# Patient Record
Sex: FEMALE | Race: BLACK OR AFRICAN AMERICAN | NOT HISPANIC OR LATINO | Employment: FULL TIME | URBAN - METROPOLITAN AREA
[De-identification: names, ages, dates, MRNs, and addresses within clinical notes are randomized per-mention and may not be internally consistent; named-entity substitution may affect disease eponyms.]

---

## 2023-12-04 ENCOUNTER — TELEPHONE (OUTPATIENT)
Dept: OBSTETRICS AND GYNECOLOGY | Facility: CLINIC | Age: 28
End: 2023-12-04
Payer: COMMERCIAL

## 2023-12-04 NOTE — TELEPHONE ENCOUNTER
"----- Message from Melo Lindo DO sent at 12/1/2023 10:50 AM EST -----  Regarding: \"pregnancy appt\"  On 12/21--make this go away if she is pregnant please.    LM to call the office to [facility] the 12/21 apt with a different provider - Dr. Lindo does not do OB's    "

## 2023-12-14 ENCOUNTER — APPOINTMENT (OUTPATIENT)
Dept: OBSTETRICS AND GYNECOLOGY | Facility: CLINIC | Age: 28
End: 2023-12-14
Payer: COMMERCIAL

## 2023-12-20 ENCOUNTER — LAB (OUTPATIENT)
Dept: LAB | Facility: LAB | Age: 28
End: 2023-12-20
Payer: COMMERCIAL

## 2023-12-20 ENCOUNTER — INITIAL PRENATAL (OUTPATIENT)
Dept: OBSTETRICS AND GYNECOLOGY | Facility: CLINIC | Age: 28
End: 2023-12-20
Payer: COMMERCIAL

## 2023-12-20 VITALS — DIASTOLIC BLOOD PRESSURE: 78 MMHG | SYSTOLIC BLOOD PRESSURE: 112 MMHG | BODY MASS INDEX: 21.9 KG/M2 | WEIGHT: 131.6 LBS

## 2023-12-20 DIAGNOSIS — Z32.01 PREGNANCY TEST POSITIVE (HHS-HCC): Primary | ICD-10-CM

## 2023-12-20 DIAGNOSIS — O26.891 PREGNANCY HEADACHE IN FIRST TRIMESTER (HHS-HCC): ICD-10-CM

## 2023-12-20 DIAGNOSIS — R51.9 PREGNANCY HEADACHE IN FIRST TRIMESTER (HHS-HCC): ICD-10-CM

## 2023-12-20 DIAGNOSIS — O21.9 NAUSEA AND VOMITING IN PREGNANCY (HHS-HCC): ICD-10-CM

## 2023-12-20 DIAGNOSIS — Z3A.08 8 WEEKS GESTATION OF PREGNANCY (HHS-HCC): ICD-10-CM

## 2023-12-20 DIAGNOSIS — Z34.81 PRENATAL CARE, SUBSEQUENT PREGNANCY IN FIRST TRIMESTER (HHS-HCC): ICD-10-CM

## 2023-12-20 DIAGNOSIS — Z32.01 PREGNANCY TEST POSITIVE (HHS-HCC): ICD-10-CM

## 2023-12-20 DIAGNOSIS — O26.811 PREGNANCY RELATED FATIGUE IN FIRST TRIMESTER (HHS-HCC): ICD-10-CM

## 2023-12-20 LAB
ABO GROUP (TYPE) IN BLOOD: NORMAL
ANTIBODY SCREEN: NORMAL
ERYTHROCYTE [DISTWIDTH] IN BLOOD BY AUTOMATED COUNT: 11.6 % (ref 11.5–14.5)
HBV SURFACE AG SERPL QL IA: NONREACTIVE
HCT VFR BLD AUTO: 39.3 % (ref 36–46)
HCV AB SER QL: NONREACTIVE
HGB BLD-MCNC: 13.7 G/DL (ref 12–16)
HIV 1+2 AB+HIV1 P24 AG SERPL QL IA: NONREACTIVE
MCH RBC QN AUTO: 30.5 PG (ref 26–34)
MCHC RBC AUTO-ENTMCNC: 34.9 G/DL (ref 32–36)
MCV RBC AUTO: 88 FL (ref 80–100)
NRBC BLD-RTO: 0 /100 WBCS (ref 0–0)
PLATELET # BLD AUTO: 233 X10*3/UL (ref 150–450)
PREGNANCY TEST URINE, POC: POSITIVE
RBC # BLD AUTO: 4.49 X10*6/UL (ref 4–5.2)
REFLEX ADDED, ANEMIA PANEL: NORMAL
RH FACTOR (ANTIGEN D): NORMAL
RUBV IGG SERPL IA-ACNC: 4.8 IA
RUBV IGG SERPL QL IA: POSITIVE
T PALLIDUM AB SER QL: NONREACTIVE
WBC # BLD AUTO: 10.5 X10*3/UL (ref 4.4–11.3)

## 2023-12-20 PROCEDURE — 36415 COLL VENOUS BLD VENIPUNCTURE: CPT

## 2023-12-20 PROCEDURE — 86901 BLOOD TYPING SEROLOGIC RH(D): CPT

## 2023-12-20 PROCEDURE — 86850 RBC ANTIBODY SCREEN: CPT

## 2023-12-20 PROCEDURE — 99204 OFFICE O/P NEW MOD 45 MIN: CPT | Performed by: ADVANCED PRACTICE MIDWIFE

## 2023-12-20 PROCEDURE — 85027 COMPLETE CBC AUTOMATED: CPT

## 2023-12-20 PROCEDURE — 87800 DETECT AGNT MULT DNA DIREC: CPT

## 2023-12-20 PROCEDURE — 87086 URINE CULTURE/COLONY COUNT: CPT

## 2023-12-20 PROCEDURE — 87340 HEPATITIS B SURFACE AG IA: CPT

## 2023-12-20 PROCEDURE — 86803 HEPATITIS C AB TEST: CPT

## 2023-12-20 PROCEDURE — 86317 IMMUNOASSAY INFECTIOUS AGENT: CPT

## 2023-12-20 PROCEDURE — 87389 HIV-1 AG W/HIV-1&-2 AB AG IA: CPT

## 2023-12-20 PROCEDURE — 86780 TREPONEMA PALLIDUM: CPT

## 2023-12-20 PROCEDURE — H1000 PRENATAL CARE ATRISK ASSESSM: HCPCS | Performed by: ADVANCED PRACTICE MIDWIFE

## 2023-12-20 PROCEDURE — 81025 URINE PREGNANCY TEST: CPT | Performed by: ADVANCED PRACTICE MIDWIFE

## 2023-12-20 PROCEDURE — 86900 BLOOD TYPING SEROLOGIC ABO: CPT

## 2023-12-20 NOTE — PROGRESS NOTES
OB History    No obstetric history on file.       New patient, here for New OB visit.   OB History: - [place] [facility] [place] H/O EAB in 2019. Specifically denies H/O HDP, PPH,  or GDM.   LMP: Sure of date, though reports amount was lighter than normal. Discussed REMEDIOS determination.   ASA prophylaxis: Only risk factor is AA race, therefore prophylaxis is not indicated.   PNV: Taking OTC without difficulties.   Past Medical History:   Diagnosis Date    Depression, unspecified 2016    Depressed state   PMH: - Depression/Anxiety- no meds, though admits to vaping THC and nicotine to assist with symptoms. Stopped vaping with + UPT and does not plan to resume use. Continues to feel anxious though denies need to see a provider or use medication. Talks with her sister which provides improvement. Encouraged to alert CNM if symptoms worsen, or if medication is needed/desired.   - Dairy and gluten intolerance   New OB Labs: Ordered. Req. given for pt. to have drawn.   Pap: Last pap done 20 and negative. No history of abnormal paps. Reviewed recommendations regarding frequency of pap screening. Will do pap with PE, at next visit.   Covid Vaccine: Received x 2. Strong recommendation for receipt of bivalent booster; pt will consider.   Flu Vaccine: Strongly recommended; pt declines.   US: Discussed dating US; order placed. Also discussed time frame for NT and anatomy US's.   NIPS: Discussed/offered testing. Pt desires, therefore will order/give kit at [facility].   Current Symptoms:  - Fatigue- discussed occurrence in first trimester. Encouraged naps, when possible.   - Nausea and vomiting- current weight represents 2 lb loss from pre-pregnancy weight. Does report improvement, especially in vomiting, with use of sea bands. Discussed other OTC and non pharm relief measures. List of meds safe in pregnancy given. Encouraged to alert CNM if N/V worsens or if Rx medication is needed/desired.   - HA- discussed  use of Mag Oxide and Tylenol PRN.   Reports recently noting symptoms of ? Candidiasis. Treated with Diflucan (had remaining from a previous Rx) and reports symptoms resolved. Discussed Diflucan use in pregnancy. Encouraged to alert CNM if symptoms noted in the future.     New OB education done.   No other questions or concerns.     Next visit:   - Review new OB labs   - Review dating US   - Do PE with pap   - Order NIPS/give kit   - Follow up on HA, fatigue, N/V   - Check on Covid booster

## 2023-12-21 ENCOUNTER — ANCILLARY PROCEDURE (OUTPATIENT)
Dept: RADIOLOGY | Facility: CLINIC | Age: 28
End: 2023-12-21
Payer: COMMERCIAL

## 2023-12-21 ENCOUNTER — APPOINTMENT (OUTPATIENT)
Dept: OBSTETRICS AND GYNECOLOGY | Facility: CLINIC | Age: 28
End: 2023-12-21
Payer: COMMERCIAL

## 2023-12-21 DIAGNOSIS — Z32.01 PREGNANCY TEST POSITIVE (HHS-HCC): ICD-10-CM

## 2023-12-21 LAB
BACTERIA UR CULT: NO GROWTH
C TRACH RRNA SPEC QL NAA+PROBE: NEGATIVE
N GONORRHOEA DNA SPEC QL PROBE+SIG AMP: NEGATIVE

## 2023-12-21 PROCEDURE — 76801 OB US < 14 WKS SINGLE FETUS: CPT | Performed by: STUDENT IN AN ORGANIZED HEALTH CARE EDUCATION/TRAINING PROGRAM

## 2023-12-21 PROCEDURE — 76817 TRANSVAGINAL US OBSTETRIC: CPT | Performed by: STUDENT IN AN ORGANIZED HEALTH CARE EDUCATION/TRAINING PROGRAM

## 2023-12-21 PROCEDURE — 76817 TRANSVAGINAL US OBSTETRIC: CPT

## 2024-01-17 ENCOUNTER — ROUTINE PRENATAL (OUTPATIENT)
Dept: OBSTETRICS AND GYNECOLOGY | Facility: CLINIC | Age: 29
End: 2024-01-17
Payer: COMMERCIAL

## 2024-01-17 DIAGNOSIS — O26.892 HEADACHE IN PREGNANCY, SECOND TRIMESTER (HHS-HCC): ICD-10-CM

## 2024-01-17 DIAGNOSIS — Z3A.12 12 WEEKS GESTATION OF PREGNANCY (HHS-HCC): ICD-10-CM

## 2024-01-17 DIAGNOSIS — R51.9 HEADACHE IN PREGNANCY, SECOND TRIMESTER (HHS-HCC): ICD-10-CM

## 2024-01-17 DIAGNOSIS — K59.00 CONSTIPATION IN PREGNANCY IN SECOND TRIMESTER (HHS-HCC): ICD-10-CM

## 2024-01-17 DIAGNOSIS — Z34.82 PRENATAL CARE, SUBSEQUENT PREGNANCY, SECOND TRIMESTER (HHS-HCC): Primary | ICD-10-CM

## 2024-01-17 DIAGNOSIS — R09.81 NASAL CONGESTION: ICD-10-CM

## 2024-01-17 DIAGNOSIS — O99.612 CONSTIPATION IN PREGNANCY IN SECOND TRIMESTER (HHS-HCC): ICD-10-CM

## 2024-01-17 PROCEDURE — 99214 OFFICE O/P EST MOD 30 MIN: CPT | Performed by: ADVANCED PRACTICE MIDWIFE

## 2024-01-18 NOTE — PROGRESS NOTES
"  Taking PNV without difficulties.   Denies s/s of UTI.   4 lb. weight gain since previous visit.   Reports N/V have significantly improved. Denies any nausea x approximately 2 weeks. Aware of relief measures if needed, as previously discussed.   PE with pap deferred to next visit due to computer's being down at time of today's visit.   Has not yet received Covid booster. Pt still considering, therefore will readdress at next visit.   Reports constipation, at times not having a BM x 4 days. Started taking Senekot and Miralax and reports improvement. Also discussed daily use of stool softeners until regular, soft BM's.   Also reports nasal congestion which is worse at night. Has been using Afrin, which does provide improvement, though pt notices she will get a HA if Afrin is used more than 3-4x/week. Discussed other relief measures, including saline nasal spray, other OTC meds safe in pregnancy.   Reviewed new OB labs- WNL.   Reviewed dating US results- REMEDIOS agrees, cystic structure noted at fetal abdomen, possibly cystic cord or midgut herniation. Pt aware of US findings.   Next US (NT) scheduled 1/19.   Continues to desire NIPS testing; will place order when computers restored and pt will  kit and req.   Reports HA's continue occur, described as a \"migraine\" generally 1x/week. Takes 1 Tylenol, though does not provide improvement. Discussed optimal dosing of Tylenol, use of Mag Oxide daily. Pt aware and will purchase.   No other questions or concerns.     There were no vitals filed for this visit.    NEXT VISIT PLAN:   - Do PE with pap   - Check on Covid booster   - Follow up on constipation   - Follow up on nasal congestion   - Review NT US results   - Review NIPS   - Follow up on HA's/Mag Oxide                 DARLENE Hunt-CECIL                "

## 2024-01-19 ENCOUNTER — HOSPITAL ENCOUNTER (OUTPATIENT)
Dept: RADIOLOGY | Facility: CLINIC | Age: 29
Discharge: HOME | End: 2024-01-19
Payer: COMMERCIAL

## 2024-01-19 DIAGNOSIS — Z36.82 ENCOUNTER FOR ANTENATAL SCREENING FOR NUCHAL TRANSLUCENCY (HHS-HCC): ICD-10-CM

## 2024-01-19 PROCEDURE — 76816 OB US FOLLOW-UP PER FETUS: CPT | Performed by: STUDENT IN AN ORGANIZED HEALTH CARE EDUCATION/TRAINING PROGRAM

## 2024-01-19 PROCEDURE — 76817 TRANSVAGINAL US OBSTETRIC: CPT

## 2024-01-19 PROCEDURE — 76817 TRANSVAGINAL US OBSTETRIC: CPT | Performed by: STUDENT IN AN ORGANIZED HEALTH CARE EDUCATION/TRAINING PROGRAM

## 2024-01-19 PROCEDURE — 76816 OB US FOLLOW-UP PER FETUS: CPT

## 2024-01-23 DIAGNOSIS — Z34.81 PRENATAL CARE, SUBSEQUENT PREGNANCY IN FIRST TRIMESTER (HHS-HCC): Primary | ICD-10-CM

## 2024-01-25 ENCOUNTER — LAB (OUTPATIENT)
Dept: LAB | Facility: LAB | Age: 29
End: 2024-01-25
Payer: COMMERCIAL

## 2024-01-25 DIAGNOSIS — Z34.81 PRENATAL CARE, SUBSEQUENT PREGNANCY IN FIRST TRIMESTER (HHS-HCC): ICD-10-CM

## 2024-02-13 LAB — SCAN RESULT: NORMAL

## 2024-02-14 ENCOUNTER — ROUTINE PRENATAL (OUTPATIENT)
Dept: OBSTETRICS AND GYNECOLOGY | Facility: CLINIC | Age: 29
End: 2024-02-14
Payer: COMMERCIAL

## 2024-02-14 VITALS — WEIGHT: 143.3 LBS | SYSTOLIC BLOOD PRESSURE: 116 MMHG | BODY MASS INDEX: 23.85 KG/M2 | DIASTOLIC BLOOD PRESSURE: 64 MMHG

## 2024-02-14 DIAGNOSIS — Z3A.16 16 WEEKS GESTATION OF PREGNANCY (HHS-HCC): ICD-10-CM

## 2024-02-14 DIAGNOSIS — Z34.82 PRENATAL CARE, SUBSEQUENT PREGNANCY, SECOND TRIMESTER (HHS-HCC): Primary | ICD-10-CM

## 2024-02-14 PROCEDURE — 87624 HPV HI-RISK TYP POOLED RSLT: CPT

## 2024-02-14 PROCEDURE — 88141 CYTOPATH C/V INTERPRET: CPT | Performed by: PATHOLOGY

## 2024-02-14 PROCEDURE — 99214 OFFICE O/P EST MOD 30 MIN: CPT | Performed by: ADVANCED PRACTICE MIDWIFE

## 2024-02-14 PROCEDURE — 88175 CYTOPATH C/V AUTO FLUID REDO: CPT

## 2024-02-14 NOTE — PROGRESS NOTES
Feeling fluttering fetal movement.   Taking PNV without difficulties.   12 lb. weight gain since last in person visit on 12/20/23. Reviewed optimal interval and total pregnancy weight gain.   Reports HA's have essentially resolved with drinking one small cup of coffee every morning.  PE and pap completed.   Is still planning Covid booster, though has forgotten to receive.   Reports constipation has improved with use of Miralax and Colace. Discussed continuance or weaning, based on symptoms.   Reports nasal congestion resolved, without intervention.   Reviewed NT US results- REMEDIOS agrees, NT could not be measured, no cord cyst noted and abdominal cord insertion noted to be WNL.   Anatomy US scheduled 3/1.   Aware of NIPS results- negative, having a BOY!   No other questions or concerns.     Vitals:    02/14/24 1431   Weight: 65 kg (143 lb 4.8 oz)       NEXT VISIT PLAN:   - Review pap result  - Check if pt received Covid booster   - Review anatomy US results             DARLENE Hunt-CECIL

## 2024-02-15 ENCOUNTER — TELEPHONE (OUTPATIENT)
Dept: OBSTETRICS AND GYNECOLOGY | Facility: CLINIC | Age: 29
End: 2024-02-15
Payer: COMMERCIAL

## 2024-02-15 NOTE — TELEPHONE ENCOUNTER
----- Message from STARLA Hunt sent at 2/13/2024  7:10 PM EST -----  Please advise Taina that her NIPS testing was negative.   Also, if she would like you to tell her, she is having a boy!

## 2024-02-28 LAB
CYTOLOGY CMNT CVX/VAG CYTO-IMP: NORMAL
HPV HR 12 DNA GENITAL QL NAA+PROBE: NEGATIVE
HPV HR GENOTYPES PNL CVX NAA+PROBE: NEGATIVE
HPV16 DNA SPEC QL NAA+PROBE: NEGATIVE
HPV18 DNA SPEC QL NAA+PROBE: NEGATIVE
LAB AP HPV GENOTYPE QUESTION: YES
LAB AP HPV HR: NORMAL
LABORATORY COMMENT REPORT: NORMAL
PATH REPORT.TOTAL CANCER: NORMAL

## 2024-03-01 ENCOUNTER — HOSPITAL ENCOUNTER (OUTPATIENT)
Dept: RADIOLOGY | Facility: CLINIC | Age: 29
Discharge: HOME | End: 2024-03-01
Payer: COMMERCIAL

## 2024-03-01 DIAGNOSIS — Z32.01 PREGNANCY TEST POSITIVE (HHS-HCC): ICD-10-CM

## 2024-03-01 PROCEDURE — 76805 OB US >/= 14 WKS SNGL FETUS: CPT

## 2024-03-01 PROCEDURE — 76805 OB US >/= 14 WKS SNGL FETUS: CPT | Performed by: STUDENT IN AN ORGANIZED HEALTH CARE EDUCATION/TRAINING PROGRAM

## 2024-03-13 ENCOUNTER — ROUTINE PRENATAL (OUTPATIENT)
Dept: OBSTETRICS AND GYNECOLOGY | Facility: CLINIC | Age: 29
End: 2024-03-13
Payer: COMMERCIAL

## 2024-03-13 VITALS — SYSTOLIC BLOOD PRESSURE: 102 MMHG | BODY MASS INDEX: 25.51 KG/M2 | WEIGHT: 153.3 LBS | DIASTOLIC BLOOD PRESSURE: 58 MMHG

## 2024-03-13 DIAGNOSIS — J30.2 SEASONAL ALLERGIES: ICD-10-CM

## 2024-03-13 DIAGNOSIS — Z3A.20 20 WEEKS GESTATION OF PREGNANCY (HHS-HCC): ICD-10-CM

## 2024-03-13 DIAGNOSIS — Z34.82 PRENATAL CARE, SUBSEQUENT PREGNANCY, SECOND TRIMESTER (HHS-HCC): Primary | ICD-10-CM

## 2024-03-13 PROCEDURE — H1000 PRENATAL CARE ATRISK ASSESSM: HCPCS | Performed by: ADVANCED PRACTICE MIDWIFE

## 2024-03-13 PROCEDURE — 99214 OFFICE O/P EST MOD 30 MIN: CPT | Performed by: ADVANCED PRACTICE MIDWIFE

## 2024-03-13 NOTE — PROGRESS NOTES
Good fetal movement.   Taking PNV without difficulties.   Denies s/s of UTI.   10 lb. weight gain since previous visit. 22 lb total gain from initial visit at 8 weeks gestation. Pt does report always feeling hungry and at times overeating. Discussed optimal interval and total pregnancy weight gains, healthy dietary/snack options.   Reports recent exacerbation of seasonal allergies. Discussed medications safe in pregnancy (pt prefers to use Zyrtec).   Taking stool softener daily and reports having a daily BM. Denies constipation. Discussed continuance.   Reviewed anatomy US results- initial US with some SV anatomy, REMEDIOS agrees. Has follow up US scheduled 3/18.   Has not yet received Covid booster, though plans to go today after visit.   Reviewed pap results- ASCUS, negative HPV. Discussed next pap in 3 years. Pt aware.   No other questions or concerns.     Vitals:    03/13/24 1102   Weight: 69.5 kg (153 lb 4.8 oz)       NEXT VISIT PLAN:   - Check if received Covid booster  - Follow up on seasonal allergies   - Review follow up US results             STARLA Hunt

## 2024-03-18 ENCOUNTER — HOSPITAL ENCOUNTER (OUTPATIENT)
Dept: RADIOLOGY | Facility: CLINIC | Age: 29
Discharge: HOME | End: 2024-03-18
Payer: COMMERCIAL

## 2024-03-18 DIAGNOSIS — Z32.01 PREGNANCY TEST POSITIVE (HHS-HCC): ICD-10-CM

## 2024-03-18 PROCEDURE — 76816 OB US FOLLOW-UP PER FETUS: CPT

## 2024-03-18 PROCEDURE — 76817 TRANSVAGINAL US OBSTETRIC: CPT

## 2024-03-18 PROCEDURE — 76817 TRANSVAGINAL US OBSTETRIC: CPT | Performed by: OBSTETRICS & GYNECOLOGY

## 2024-03-18 PROCEDURE — 76816 OB US FOLLOW-UP PER FETUS: CPT | Performed by: OBSTETRICS & GYNECOLOGY

## 2024-04-10 ENCOUNTER — ROUTINE PRENATAL (OUTPATIENT)
Dept: OBSTETRICS AND GYNECOLOGY | Facility: CLINIC | Age: 29
End: 2024-04-10
Payer: COMMERCIAL

## 2024-04-10 VITALS — SYSTOLIC BLOOD PRESSURE: 112 MMHG | BODY MASS INDEX: 27.11 KG/M2 | DIASTOLIC BLOOD PRESSURE: 72 MMHG | WEIGHT: 162.9 LBS

## 2024-04-10 DIAGNOSIS — Z34.82 PRENATAL CARE, SUBSEQUENT PREGNANCY, SECOND TRIMESTER (HHS-HCC): Primary | ICD-10-CM

## 2024-04-10 DIAGNOSIS — Z3A.24 24 WEEKS GESTATION OF PREGNANCY (HHS-HCC): ICD-10-CM

## 2024-04-10 DIAGNOSIS — J30.2 SEASONAL ALLERGIES: ICD-10-CM

## 2024-04-10 DIAGNOSIS — J45.30 MILD PERSISTENT ASTHMA WITHOUT COMPLICATION (HHS-HCC): ICD-10-CM

## 2024-04-10 DIAGNOSIS — G47.9 SLEEPING DIFFICULTIES: ICD-10-CM

## 2024-04-10 PROCEDURE — 99214 OFFICE O/P EST MOD 30 MIN: CPT | Performed by: ADVANCED PRACTICE MIDWIFE

## 2024-04-10 RX ORDER — ALBUTEROL SULFATE 90 UG/1
2 AEROSOL, METERED RESPIRATORY (INHALATION) EVERY 6 HOURS PRN
Qty: 18 G | Refills: 1 | Status: SHIPPED | OUTPATIENT
Start: 2024-04-10 | End: 2025-04-10

## 2024-04-10 NOTE — PROGRESS NOTES
Good fetal movement. Denies contractions, vaginal bleeding, or LOF.   Taking PNV without difficulties.   Denies s/s of UTI.   9 lb. weight gain since previous visit.   Has appt for Covid booster tomorrow.   Discussed 1 hour GTT, CBC to be done at or just prior to next visit. Pt reports she will have done immediately after next visit. Order placed today. Reviewed instructions for testing.   Has B+ blood type.   Reviewed US results- all anatomy now seen and WNL.   Next US scheduled 5/24 for growth, secondary to history of abdominal liposuction.   Reports seasonal allergies managed with almost daily use of Zyrtec or Claritin.   Has past history of asthma which has recently exacerbated and has resulted in frequent episodes of wheezing and feeling short of breath, generally occurring at night. Used Albuterol in the past, though did not currently have her own inhaler, therefore has been using an Albuterol inhaler belonging to her son. Reports some improvement with use, though requires 1-2 puffs generally 5 out of 7 nights per week. Discussed difference between rescue and maintenance meds for asthma. Does not currently have a primary care provider, therefore referral placed. Encouraged to be seen as soon as possible. Pt in agreement.   Reports some difficulties getting comfortable and sleeping well. Has purchased pregnancy pillow to assess for improvement.   No other questions or concerns.       Vitals:    04/10/24 1050   Weight: 73.9 kg (162 lb 14.4 oz)       NEXT VISIT PLAN:   - 1 hour GTT, CBC following visit  - Tdap at next visit, if desired   - Follow up on primary care visit/asthma   - Growth US scheduled 5/24           STARLA Hunt

## 2024-04-26 ENCOUNTER — OFFICE VISIT (OUTPATIENT)
Dept: PRIMARY CARE | Facility: CLINIC | Age: 29
End: 2024-04-26
Payer: COMMERCIAL

## 2024-04-26 VITALS
SYSTOLIC BLOOD PRESSURE: 98 MMHG | DIASTOLIC BLOOD PRESSURE: 56 MMHG | HEIGHT: 65 IN | BODY MASS INDEX: 28.66 KG/M2 | TEMPERATURE: 98.1 F | WEIGHT: 172 LBS | HEART RATE: 118 BPM

## 2024-04-26 DIAGNOSIS — J45.40 MODERATE PERSISTENT ASTHMA, UNSPECIFIED WHETHER COMPLICATED (HHS-HCC): Primary | ICD-10-CM

## 2024-04-26 DIAGNOSIS — J30.9 ALLERGIC RHINITIS, UNSPECIFIED SEASONALITY, UNSPECIFIED TRIGGER: ICD-10-CM

## 2024-04-26 PROBLEM — F32.A DEPRESSIVE DISORDER: Status: ACTIVE | Noted: 2024-04-26

## 2024-04-26 PROBLEM — N89.8 VAGINAL IRRITATION: Status: ACTIVE | Noted: 2024-04-26

## 2024-04-26 PROBLEM — A74.9 CHLAMYDIA INFECTION: Status: RESOLVED | Noted: 2024-04-26 | Resolved: 2024-04-26

## 2024-04-26 PROBLEM — E73.9 LACTOSE INTOLERANCE: Status: ACTIVE | Noted: 2024-04-26

## 2024-04-26 PROBLEM — R14.0 BLOATING: Status: ACTIVE | Noted: 2024-04-26

## 2024-04-26 PROBLEM — R10.2 PELVIC PAIN IN FEMALE: Status: ACTIVE | Noted: 2024-04-26

## 2024-04-26 PROBLEM — R76.8 POSITIVE AUTOANTIBODY SCREENING FOR CELIAC DISEASE: Status: ACTIVE | Noted: 2024-04-26

## 2024-04-26 PROBLEM — K63.8219 SMALL INTESTINAL BACTERIAL OVERGROWTH: Status: ACTIVE | Noted: 2024-04-26

## 2024-04-26 PROBLEM — A74.9 CHLAMYDIA INFECTION: Status: ACTIVE | Noted: 2024-04-26

## 2024-04-26 PROBLEM — R11.0 NAUSEA: Status: ACTIVE | Noted: 2024-04-26

## 2024-04-26 PROCEDURE — 1036F TOBACCO NON-USER: CPT | Performed by: FAMILY MEDICINE

## 2024-04-26 PROCEDURE — 99214 OFFICE O/P EST MOD 30 MIN: CPT | Performed by: FAMILY MEDICINE

## 2024-04-26 RX ORDER — FAMOTIDINE 20 MG/1
20 TABLET, FILM COATED ORAL EVERY 12 HOURS PRN
COMMUNITY
Start: 2024-02-08

## 2024-04-26 RX ORDER — FLUTICASONE PROPIONATE 50 MCG
2 SPRAY, SUSPENSION (ML) NASAL DAILY
Qty: 16 G | Refills: 5 | Status: SHIPPED | OUTPATIENT
Start: 2024-04-26 | End: 2024-05-20

## 2024-04-26 RX ORDER — FLUTICASONE PROPIONATE 220 UG/1
2 AEROSOL, METERED RESPIRATORY (INHALATION)
Qty: 12 G | Refills: 5 | Status: SHIPPED | OUTPATIENT
Start: 2024-04-26 | End: 2025-04-26

## 2024-04-26 ASSESSMENT — PATIENT HEALTH QUESTIONNAIRE - PHQ9
SUM OF ALL RESPONSES TO PHQ9 QUESTIONS 1 AND 2: 0
1. LITTLE INTEREST OR PLEASURE IN DOING THINGS: NOT AT ALL
2. FEELING DOWN, DEPRESSED OR HOPELESS: NOT AT ALL

## 2024-04-26 NOTE — PROGRESS NOTES
"     BP 98/56   Pulse (!) 118   Temp 36.7 °C (98.1 °F)   Ht 1.651 m (5' 5\")   Wt 78 kg (172 lb)   LMP 10/20/2023   BMI 28.62 kg/m²     Past Medical History:   Diagnosis Date    Depression, unspecified 09/16/2016    Depressed state       Patient Active Problem List   Diagnosis    Bloating    Chlamydia infection    Depressive disorder    Lactose intolerance    Nausea    Pelvic pain in female    Positive autoantibody screening for celiac disease    Small intestinal bacterial overgrowth    Vaginal irritation       Current Outpatient Medications   Medication Sig Dispense Refill    albuterol 90 mcg/actuation inhaler Inhale 2 puffs every 6 hours if needed for wheezing. 18 g 1    famotidine (Pepcid) 20 mg tablet Take 1 tablet (20 mg) by mouth every 12 hours if needed.      prenatal vit37/iron/folic acid (PRENATA ORAL) Take by mouth.       No current facility-administered medications for this visit.       CC/HPI/ASSESSMENT/PLAN    CC asthma    HPI patient 28-year-old female establishing care in the office today notes she has severe allergies and asthma.  She is currently pregnant.  She notes since pregnancy her asthma has been worse.  She been using her rescue inhaler multiple times a day.  She is taking Zyrtec over-the-counter for her allergies.  She notes persistent postnasal drainage and sneezing.  She wheezes frequently and uses her albuterol for relief.  Denies chest pain syncope abdominal pain blood in urine or stool    Exam calm eyes no jaundice mouth moist throat clear neck supple no LAD lungs good air exchange mild wheezing noted in the bases no rales or rhonchi.  CV RRR no murmur.  Ext no edema skin no rash    A/P 1.  Moderate persistent asthma.  Patient will start Flovent 222 puffs twice daily.  Continue to use albuterol as needed.  #2 allergic rhinitis.  Continue Zyrtec 10 mg daily, will add Flonase 2 sprays twice daily for a week then once a day afterwards.  She is advised to avoid outdoor pollens is much " as possible.  She will follow-up 6 weeks    There are no diagnoses linked to this encounter.

## 2024-05-03 ENCOUNTER — ROUTINE PRENATAL (OUTPATIENT)
Dept: OBSTETRICS AND GYNECOLOGY | Facility: CLINIC | Age: 29
End: 2024-05-03
Payer: COMMERCIAL

## 2024-05-03 ENCOUNTER — LAB (OUTPATIENT)
Dept: LAB | Facility: LAB | Age: 29
End: 2024-05-03
Payer: COMMERCIAL

## 2024-05-03 VITALS — DIASTOLIC BLOOD PRESSURE: 72 MMHG | SYSTOLIC BLOOD PRESSURE: 102 MMHG | WEIGHT: 167 LBS | BODY MASS INDEX: 27.79 KG/M2

## 2024-05-03 DIAGNOSIS — Z3A.28 28 WEEKS GESTATION OF PREGNANCY (HHS-HCC): ICD-10-CM

## 2024-05-03 DIAGNOSIS — Z13.1 SCREENING FOR DIABETES MELLITUS: ICD-10-CM

## 2024-05-03 DIAGNOSIS — R73.09 GLUCOSE TOLERANCE TEST ABNORMAL: Primary | ICD-10-CM

## 2024-05-03 DIAGNOSIS — Z34.83 PRENATAL CARE, SUBSEQUENT PREGNANCY, THIRD TRIMESTER (HHS-HCC): Primary | ICD-10-CM

## 2024-05-03 DIAGNOSIS — J45.40 MODERATE PERSISTENT ASTHMA WITHOUT COMPLICATION (HHS-HCC): ICD-10-CM

## 2024-05-03 DIAGNOSIS — L29.9 ITCHING: ICD-10-CM

## 2024-05-03 DIAGNOSIS — J30.2 SEASONAL ALLERGIES: ICD-10-CM

## 2024-05-03 LAB
ERYTHROCYTE [DISTWIDTH] IN BLOOD BY AUTOMATED COUNT: 12 % (ref 11.5–14.5)
GLUCOSE 1H P 50 G GLC PO SERPL-MCNC: 139 MG/DL
HCT VFR BLD AUTO: 39.7 % (ref 36–46)
HGB BLD-MCNC: 13.4 G/DL (ref 12–16)
MCH RBC QN AUTO: 30.5 PG (ref 26–34)
MCHC RBC AUTO-ENTMCNC: 33.8 G/DL (ref 32–36)
MCV RBC AUTO: 90 FL (ref 80–100)
NRBC BLD-RTO: 0 /100 WBCS (ref 0–0)
PLATELET # BLD AUTO: 264 X10*3/UL (ref 150–450)
RBC # BLD AUTO: 4.4 X10*6/UL (ref 4–5.2)
WBC # BLD AUTO: 10.9 X10*3/UL (ref 4.4–11.3)

## 2024-05-03 PROCEDURE — 99214 OFFICE O/P EST MOD 30 MIN: CPT | Performed by: ADVANCED PRACTICE MIDWIFE

## 2024-05-03 PROCEDURE — 36415 COLL VENOUS BLD VENIPUNCTURE: CPT

## 2024-05-03 PROCEDURE — 82947 ASSAY GLUCOSE BLOOD QUANT: CPT

## 2024-05-03 PROCEDURE — 90715 TDAP VACCINE 7 YRS/> IM: CPT | Performed by: ADVANCED PRACTICE MIDWIFE

## 2024-05-03 PROCEDURE — 85027 COMPLETE CBC AUTOMATED: CPT

## 2024-05-03 PROCEDURE — 90471 IMMUNIZATION ADMIN: CPT | Performed by: ADVANCED PRACTICE MIDWIFE

## 2024-05-03 ASSESSMENT — EDINBURGH POSTNATAL DEPRESSION SCALE (EPDS)
I HAVE BLAMED MYSELF UNNECESSARILY WHEN THINGS WENT WRONG: NO, NEVER
I HAVE BEEN ABLE TO LAUGH AND SEE THE FUNNY SIDE OF THINGS: AS MUCH AS I ALWAYS COULD
THE THOUGHT OF HARMING MYSELF HAS OCCURRED TO ME: NEVER
I HAVE FELT SAD OR MISERABLE: NO, NOT AT ALL
THINGS HAVE BEEN GETTING ON TOP OF ME: NO, I HAVE BEEN COPING AS WELL AS EVER
I HAVE BEEN SO UNHAPPY THAT I HAVE BEEN CRYING: NO, NEVER
I HAVE FELT SCARED OR PANICKY FOR NO GOOD REASON: NO, NOT AT ALL
TOTAL SCORE: 3
I HAVE LOOKED FORWARD WITH ENJOYMENT TO THINGS: AS MUCH AS I EVER DID
I HAVE BEEN ANXIOUS OR WORRIED FOR NO GOOD REASON: YES, VERY OFTEN
I HAVE BEEN SO UNHAPPY THAT I HAVE HAD DIFFICULTY SLEEPING: NOT AT ALL

## 2024-05-03 NOTE — PROGRESS NOTES
Good fetal movement. Denies contractions, vaginal bleeding, or LOF.   Denies s/s of UTI.   5 lb. weight loss since previous visit. Eating well, good appetite, denies N/V. No apparent reason for 5 lb weight loss.   Discussed 1 hour GTT, CBC to be done at this time. Reviewed instructions for testing. Pt reports she will have done immediately following visit.   Has B+ blood type.   Discussed/offered Tdap; pt desires, therefore given today.   Saw a new PCP, Dr Acosta, on 4/26 secondary to allergic rhinitis and asthma. Was started on Flovent and encouraged to continue Albuterol PRN. Also advised to continue Zyrtec and start Flonase. Is using Flovent as directed, has not used Albuterol since starting Flovent. Does not feel like it is helping, as she continues to have a significant amount of wheezing at night. Discussed PRN use of Albuterol to assess for improvement in wheezing. If consistent use of Albuterol is needed, encouraged to follow up with PCP.   Also reports Zyrtec is no longer effective, though has been taking Benadryl. Tried Flonase, though results in severe burning sensation in nose. Encouraged to alert PCP re this. Pt in agreement.   Has [employer]  scheduled 5/24.   Reports onset of whole body itching yesterday. Pt feels itching is related to worsening seasonal allergies. Denies any rash. Uses Dial or Ivory soap and denies change in soap, lotion, detergent, softener, etc. Denies itching on palms of hands or soles of feet. Discussed skin care measures, use of moisturizing lotion, avoidance of harsh products, pregnancy related warning signs. Encouraged to follow up with PCP if symptoms worsen. Pt in agreement.   No other questions or concerns.     Vitals:    05/03/24 1119   Weight: 75.8 kg (167 lb)       NEXT VISIT PLAN:   - Tdap given today   - Review 1 hour GTT, CBC   - Follow up on asthma, allergies and PCP visit   - Growth US 5/24   - Follow up on itching             Syeda Kelly  APRN-CNM

## 2024-05-04 LAB — REFLEX ADDED, ANEMIA PANEL: NORMAL

## 2024-05-06 ENCOUNTER — TELEPHONE (OUTPATIENT)
Dept: OBSTETRICS AND GYNECOLOGY | Facility: CLINIC | Age: 29
End: 2024-05-06
Payer: COMMERCIAL

## 2024-05-06 NOTE — TELEPHONE ENCOUNTER
----- Message from STARLA Hunt sent at 5/3/2024  4:06 PM EDT -----  Please advise pt that 1 hour GTT was elevated. Needs 3 hour GTT as soon as possible. Order is in chart.   Please review instructions for testing with pt.

## 2024-05-07 ENCOUNTER — LAB (OUTPATIENT)
Dept: LAB | Facility: LAB | Age: 29
End: 2024-05-07
Payer: COMMERCIAL

## 2024-05-07 DIAGNOSIS — R73.09 GLUCOSE TOLERANCE TEST ABNORMAL: ICD-10-CM

## 2024-05-07 LAB
GLUCOSE 1H P 100 G GLC PO SERPL-MCNC: 122 MG/DL
GLUCOSE 2H P 100 G GLC PO SERPL-MCNC: 100 MG/DL
GLUCOSE 3H P 100 G GLC PO SERPL-MCNC: 91 MG/DL
GLUCOSE P FAST SERPL-MCNC: 62 MG/DL

## 2024-05-07 PROCEDURE — 36415 COLL VENOUS BLD VENIPUNCTURE: CPT

## 2024-05-07 PROCEDURE — 82950 GLUCOSE TEST: CPT

## 2024-05-07 PROCEDURE — 82952 GTT-ADDED SAMPLES: CPT

## 2024-05-07 PROCEDURE — 82947 ASSAY GLUCOSE BLOOD QUANT: CPT

## 2024-05-07 PROCEDURE — 82951 GLUCOSE TOLERANCE TEST (GTT): CPT

## 2024-05-24 ENCOUNTER — HOSPITAL ENCOUNTER (OUTPATIENT)
Dept: RADIOLOGY | Facility: CLINIC | Age: 29
Discharge: HOME | End: 2024-05-24
Payer: COMMERCIAL

## 2024-05-24 DIAGNOSIS — O36.5930 MATERNAL CARE FOR OTHER KNOWN OR SUSPECTED POOR FETAL GROWTH, THIRD TRIMESTER, NOT APPLICABLE OR UNSPECIFIED (HHS-HCC): ICD-10-CM

## 2024-05-24 DIAGNOSIS — Z32.01 PREGNANCY TEST POSITIVE (HHS-HCC): ICD-10-CM

## 2024-05-24 PROCEDURE — 76816 OB US FOLLOW-UP PER FETUS: CPT | Performed by: OBSTETRICS & GYNECOLOGY

## 2024-05-24 PROCEDURE — 76820 UMBILICAL ARTERY ECHO: CPT

## 2024-05-24 PROCEDURE — 76819 FETAL BIOPHYS PROFIL W/O NST: CPT | Performed by: OBSTETRICS & GYNECOLOGY

## 2024-05-24 PROCEDURE — 76816 OB US FOLLOW-UP PER FETUS: CPT

## 2024-05-24 PROCEDURE — 76820 UMBILICAL ARTERY ECHO: CPT | Performed by: OBSTETRICS & GYNECOLOGY

## 2024-05-24 PROCEDURE — 76819 FETAL BIOPHYS PROFIL W/O NST: CPT

## 2024-05-28 ENCOUNTER — DOCUMENTATION (OUTPATIENT)
Dept: OBSTETRICS AND GYNECOLOGY | Facility: HOSPITAL | Age: 29
End: 2024-05-28
Payer: COMMERCIAL

## 2024-05-28 PROBLEM — Z34.80 PRENATAL CARE OF MULTIGRAVIDA, ANTEPARTUM (HHS-HCC): Status: ACTIVE | Noted: 2024-05-28

## 2024-05-28 PROBLEM — O36.5990 FETAL GROWTH RESTRICTION ANTEPARTUM (HHS-HCC): Status: ACTIVE | Noted: 2024-05-28

## 2024-05-28 PROBLEM — R10.2 PELVIC PAIN IN FEMALE: Status: RESOLVED | Noted: 2024-04-26 | Resolved: 2024-05-28

## 2024-05-28 PROBLEM — Z36.4 ULTRASOUND FOR ANTENATAL SCREENING FOR FETAL GROWTH RESTRICTION (HHS-HCC): Status: ACTIVE | Noted: 2024-05-28

## 2024-05-28 PROBLEM — N89.8 VAGINAL IRRITATION: Status: RESOLVED | Noted: 2024-04-26 | Resolved: 2024-05-28

## 2024-05-28 NOTE — PROGRESS NOTES
Reviewed recent US findings with Dr Sinhg- pt currently 31.4 weeks, on US AC individually noted to be 7%, EFW 19%, ANDREZ, BPP, Dopplers WNL. Plan per MFM is weekly BPP/Dopplers, which are scheduled.   Per Dr Singh, FGR by AC alone. Pt is getting appropriate weekly testing. Plan for delivery per [facility] recommendations.   Physician consult visit not necessary per Dr Singh.

## 2024-05-31 ENCOUNTER — HOSPITAL ENCOUNTER (OUTPATIENT)
Dept: RADIOLOGY | Facility: CLINIC | Age: 29
Discharge: HOME | End: 2024-05-31
Payer: COMMERCIAL

## 2024-05-31 DIAGNOSIS — Z32.01 PREGNANCY TEST POSITIVE (HHS-HCC): ICD-10-CM

## 2024-05-31 DIAGNOSIS — O36.5990 MATERNAL CARE FOR OTHER KNOWN OR SUSPECTED POOR FETAL GROWTH, UNSPECIFIED TRIMESTER, NOT APPLICABLE OR UNSPECIFIED (HHS-HCC): ICD-10-CM

## 2024-05-31 PROCEDURE — 76820 UMBILICAL ARTERY ECHO: CPT | Performed by: OBSTETRICS & GYNECOLOGY

## 2024-05-31 PROCEDURE — 76819 FETAL BIOPHYS PROFIL W/O NST: CPT | Performed by: OBSTETRICS & GYNECOLOGY

## 2024-05-31 PROCEDURE — 76820 UMBILICAL ARTERY ECHO: CPT

## 2024-05-31 PROCEDURE — 76819 FETAL BIOPHYS PROFIL W/O NST: CPT

## 2024-06-04 PROBLEM — Z36.4 ULTRASOUND FOR ANTENATAL SCREENING FOR FETAL GROWTH RESTRICTION (HHS-HCC): Status: RESOLVED | Noted: 2024-05-28 | Resolved: 2024-06-04

## 2024-06-05 ENCOUNTER — ROUTINE PRENATAL (OUTPATIENT)
Dept: OBSTETRICS AND GYNECOLOGY | Facility: CLINIC | Age: 29
End: 2024-06-05
Payer: COMMERCIAL

## 2024-06-05 VITALS — SYSTOLIC BLOOD PRESSURE: 106 MMHG | DIASTOLIC BLOOD PRESSURE: 64 MMHG | WEIGHT: 178.7 LBS | BODY MASS INDEX: 29.74 KG/M2

## 2024-06-05 DIAGNOSIS — Z34.80 PRENATAL CARE OF MULTIGRAVIDA, ANTEPARTUM (HHS-HCC): ICD-10-CM

## 2024-06-05 DIAGNOSIS — Z3A.32 32 WEEKS GESTATION OF PREGNANCY (HHS-HCC): Primary | ICD-10-CM

## 2024-06-05 DIAGNOSIS — J30.2 SEASONAL ALLERGIES: ICD-10-CM

## 2024-06-05 DIAGNOSIS — O36.5990 FETAL GROWTH RESTRICTION ANTEPARTUM (HHS-HCC): ICD-10-CM

## 2024-06-05 DIAGNOSIS — J45.40 MODERATE PERSISTENT ASTHMA WITHOUT COMPLICATION (HHS-HCC): ICD-10-CM

## 2024-06-05 PROCEDURE — 99214 OFFICE O/P EST MOD 30 MIN: CPT | Performed by: ADVANCED PRACTICE MIDWIFE

## 2024-06-05 NOTE — PROGRESS NOTES
"  Good fetal movement. Denies regular contractions, vaginal bleeding, or LOF. Does report occasional BH contractions, though not time able.   Aware of CNM emergency number and importance of calling prior to going to the hospital.   Taking PNV and other meds in med list, without difficulties.   Denies s/s of UTI.   11 lb. weight gain since previous visit.   Reviewed 1 hour GTT (139), 3 hour GTT (all WNL), and CBC (13.4/39.7) results.   Reports allergies are \"100% better\" with use of Flonase and Zyrtec daily.   Continues to have occasional episodes of wheezing at night, though feels that asthma is overall much better with use of Flovent.   Also reports itching resolved with use of allergy medication.   Appt with PCP for asthma and allergy follow-up will be re-scheduled due to BPP being scheduled at the same time.   Pt aware of growth US results, FGR by AC alone, and plan for weekly BPP/Dopplers, which are scheduled.   Last BPP/Dopplers on 5/31- 8/8, WNL.   Next scheduled on 6/7. Pt aware.   No other questions or concerns.     Vitals:    06/05/24 1422   Weight: 81.1 kg (178 lb 11.2 oz)       NEXT VISIT PLAN:   - Check if PCP visit re-scheduled   - Continue weekly BPP/Dopplers               DARLENE Hunt-MILENAM                "

## 2024-06-07 ENCOUNTER — APPOINTMENT (OUTPATIENT)
Dept: PRIMARY CARE | Facility: CLINIC | Age: 29
End: 2024-06-07
Payer: COMMERCIAL

## 2024-06-07 ENCOUNTER — HOSPITAL ENCOUNTER (OUTPATIENT)
Dept: RADIOLOGY | Facility: CLINIC | Age: 29
Discharge: HOME | End: 2024-06-07
Payer: COMMERCIAL

## 2024-06-07 DIAGNOSIS — Z32.01 PREGNANCY TEST POSITIVE (HHS-HCC): ICD-10-CM

## 2024-06-07 DIAGNOSIS — O09.299: ICD-10-CM

## 2024-06-07 PROCEDURE — 76819 FETAL BIOPHYS PROFIL W/O NST: CPT

## 2024-06-07 PROCEDURE — 76820 UMBILICAL ARTERY ECHO: CPT

## 2024-06-07 PROCEDURE — 76819 FETAL BIOPHYS PROFIL W/O NST: CPT | Performed by: OBSTETRICS & GYNECOLOGY

## 2024-06-07 PROCEDURE — 76820 UMBILICAL ARTERY ECHO: CPT | Performed by: OBSTETRICS & GYNECOLOGY

## 2024-06-17 ENCOUNTER — HOSPITAL ENCOUNTER (OUTPATIENT)
Dept: RADIOLOGY | Facility: CLINIC | Age: 29
Discharge: HOME | End: 2024-06-17
Payer: COMMERCIAL

## 2024-06-17 DIAGNOSIS — O36.5990 FETAL GROWTH RESTRICTION ANTEPARTUM (HHS-HCC): ICD-10-CM

## 2024-06-17 DIAGNOSIS — Z32.01 PREGNANCY TEST POSITIVE (HHS-HCC): ICD-10-CM

## 2024-06-17 PROCEDURE — 76819 FETAL BIOPHYS PROFIL W/O NST: CPT | Performed by: OBSTETRICS & GYNECOLOGY

## 2024-06-17 PROCEDURE — 76820 UMBILICAL ARTERY ECHO: CPT

## 2024-06-17 PROCEDURE — 76819 FETAL BIOPHYS PROFIL W/O NST: CPT

## 2024-06-17 PROCEDURE — 76816 OB US FOLLOW-UP PER FETUS: CPT | Performed by: OBSTETRICS & GYNECOLOGY

## 2024-06-17 PROCEDURE — 76816 OB US FOLLOW-UP PER FETUS: CPT

## 2024-06-17 PROCEDURE — 76820 UMBILICAL ARTERY ECHO: CPT | Performed by: OBSTETRICS & GYNECOLOGY

## 2024-06-19 ENCOUNTER — APPOINTMENT (OUTPATIENT)
Dept: OBSTETRICS AND GYNECOLOGY | Facility: CLINIC | Age: 29
End: 2024-06-19
Payer: COMMERCIAL

## 2024-06-19 VITALS — SYSTOLIC BLOOD PRESSURE: 120 MMHG | BODY MASS INDEX: 30.1 KG/M2 | DIASTOLIC BLOOD PRESSURE: 76 MMHG | WEIGHT: 180.9 LBS

## 2024-06-19 DIAGNOSIS — Z3A.34 34 WEEKS GESTATION OF PREGNANCY (HHS-HCC): ICD-10-CM

## 2024-06-19 DIAGNOSIS — Z34.83 PRENATAL CARE, SUBSEQUENT PREGNANCY, THIRD TRIMESTER (HHS-HCC): Primary | ICD-10-CM

## 2024-06-19 DIAGNOSIS — O36.5990 FETAL GROWTH RESTRICTION ANTEPARTUM (HHS-HCC): ICD-10-CM

## 2024-06-19 DIAGNOSIS — R12 HEARTBURN DURING PREGNANCY IN THIRD TRIMESTER (HHS-HCC): ICD-10-CM

## 2024-06-19 DIAGNOSIS — O26.893 HEARTBURN DURING PREGNANCY IN THIRD TRIMESTER (HHS-HCC): ICD-10-CM

## 2024-06-19 PROCEDURE — 99214 OFFICE O/P EST MOD 30 MIN: CPT | Performed by: ADVANCED PRACTICE MIDWIFE

## 2024-06-19 NOTE — PROGRESS NOTES
Good fetal movement. Denies regular contractions, vaginal bleeding, or LOF. Does report some contractions which are not time able and only mildly uncomfortable.   Reviewed s/s of PTL, warning signs, how/when to contact CNM.   Aware of CNM emergency number and importance of calling prior to going to the hospital.   Taking PNV and other meds in med list, without difficulties.   2 lb. weight gain since previous visit.   Reports episodes of heartburn, generally relieved with use of Pepcid at HS.   Has not yet been rescheduled with PCP. Is awaiting phone call from their office for a new appt. Stressed importance of following up, as directed.   Reviewed US/BPP/Dopplers result from 6/17- EFW and AC now <10%. EFW 4%. ANDREZ, BPP, Dopplers WNL. Recommend continued weekly Dopplers and follow up US for biometry and delivery planning at 37 weeks (scheduled 7/5).   These findings were discussed, in detail, with pt today. Discussed physician consult visit given FGR by EFW and AC (previously, only by AC), possibility of transfer to physician care if EFW less than 3%, possibility of recommended delivery at [facility] [place], possible fetal intolerance of labor, possible transfer of infant to [facility] [place] after delivery. Pt voiced understanding of all.   No other questions or concerns.       Vitals:    06/19/24 1405   Weight: 82.1 kg (180 lb 14.4 oz)       NEXT VISIT PLAN:   - GBS screening   - Sign labor consent   - Physician consult visit scheduled, secondary to FGR   - Continue weekly Dopplers/BPP  - Growth US for biometry and delivery planning, scheduled 7/5      DARLENE Hunt-CECIL

## 2024-06-27 ENCOUNTER — HOSPITAL ENCOUNTER (OUTPATIENT)
Dept: RADIOLOGY | Facility: CLINIC | Age: 29
Discharge: HOME | End: 2024-06-27
Payer: COMMERCIAL

## 2024-06-27 DIAGNOSIS — Z32.01 PREGNANCY TEST POSITIVE (HHS-HCC): ICD-10-CM

## 2024-06-27 DIAGNOSIS — O36.5930 INTRAUTERINE GROWTH RESTRICTION (IUGR) AFFECTING CARE OF MOTHER, THIRD TRIMESTER, SINGLE GESTATION (HHS-HCC): ICD-10-CM

## 2024-06-27 PROCEDURE — 76820 UMBILICAL ARTERY ECHO: CPT

## 2024-06-27 PROCEDURE — 76819 FETAL BIOPHYS PROFIL W/O NST: CPT

## 2024-06-27 PROCEDURE — 76820 UMBILICAL ARTERY ECHO: CPT | Performed by: OBSTETRICS & GYNECOLOGY

## 2024-06-27 PROCEDURE — 76819 FETAL BIOPHYS PROFIL W/O NST: CPT | Performed by: OBSTETRICS & GYNECOLOGY

## 2024-07-03 ENCOUNTER — APPOINTMENT (OUTPATIENT)
Dept: OBSTETRICS AND GYNECOLOGY | Facility: CLINIC | Age: 29
End: 2024-07-03
Payer: COMMERCIAL

## 2024-07-03 VITALS — SYSTOLIC BLOOD PRESSURE: 118 MMHG | DIASTOLIC BLOOD PRESSURE: 60 MMHG | WEIGHT: 182.8 LBS | BODY MASS INDEX: 30.42 KG/M2

## 2024-07-03 DIAGNOSIS — O36.5990 FETAL GROWTH RESTRICTION ANTEPARTUM (HHS-HCC): ICD-10-CM

## 2024-07-03 DIAGNOSIS — R20.2 PARESTHESIA OF BOTH HANDS: ICD-10-CM

## 2024-07-03 DIAGNOSIS — Z34.83 PRENATAL CARE, SUBSEQUENT PREGNANCY, THIRD TRIMESTER (HHS-HCC): Primary | ICD-10-CM

## 2024-07-03 DIAGNOSIS — Z3A.36 36 WEEKS GESTATION OF PREGNANCY (HHS-HCC): ICD-10-CM

## 2024-07-03 PROCEDURE — 99214 OFFICE O/P EST MOD 30 MIN: CPT | Performed by: ADVANCED PRACTICE MIDWIFE

## 2024-07-03 PROCEDURE — 87081 CULTURE SCREEN ONLY: CPT

## 2024-07-03 NOTE — PROGRESS NOTES
Good fetal movement. Denies regular contractions, vaginal bleeding, or LOF. Reports more frequent BH contractions, though generally not painful and are not time able.   Aware of CNM emergency number and importance of calling prior to going to the hospital.   Denies s/s of UTI.   2 lb. weight gain since previous visit.  Reports recent tingling and numbness in bilateral hands. Discussed cause, pseudo carpal tunnel, relief measures.   GBS screening done today.   Labor consent reviewed and signed.   Last BPP/Dopplers done 6/27- 8/8, WNL.   Has growth US for biometry and delivery planning scheduled 7/5. CNM will contact pt once US completed, to review results and schedule IOL according to recommendations.   Has physician consult visit (per guidelines, for FGR) scheduled with Dr Rubio, for next visit.   No other questions or concerns.          Vitals:    07/03/24 1413   Weight: 82.9 kg (182 lb 12.8 oz)       NEXT VISIT PLAN:   - Review GBS results   - Review growth US results   - Follow up on plan for IOL            DARLENE Hunt-CECIL

## 2024-07-05 ENCOUNTER — HOSPITAL ENCOUNTER (OUTPATIENT)
Dept: RADIOLOGY | Facility: CLINIC | Age: 29
Discharge: HOME | End: 2024-07-05
Payer: COMMERCIAL

## 2024-07-05 ENCOUNTER — PREP FOR PROCEDURE (OUTPATIENT)
Dept: OBSTETRICS AND GYNECOLOGY | Facility: CLINIC | Age: 29
End: 2024-07-05
Payer: COMMERCIAL

## 2024-07-05 ENCOUNTER — DOCUMENTATION (OUTPATIENT)
Dept: OBSTETRICS AND GYNECOLOGY | Facility: CLINIC | Age: 29
End: 2024-07-05
Payer: COMMERCIAL

## 2024-07-05 DIAGNOSIS — Z32.01 PREGNANCY TEST POSITIVE (HHS-HCC): ICD-10-CM

## 2024-07-05 DIAGNOSIS — O36.5990 FETAL GROWTH RESTRICTION ANTEPARTUM (HHS-HCC): Primary | ICD-10-CM

## 2024-07-05 DIAGNOSIS — Z34.83 PRENATAL CARE, SUBSEQUENT PREGNANCY, THIRD TRIMESTER (HHS-HCC): ICD-10-CM

## 2024-07-05 DIAGNOSIS — O36.5990 IUGR (INTRAUTERINE GROWTH RESTRICTION) AFFECTING CARE OF MOTHER (HHS-HCC): ICD-10-CM

## 2024-07-05 PROCEDURE — 76819 FETAL BIOPHYS PROFIL W/O NST: CPT

## 2024-07-05 PROCEDURE — 76820 UMBILICAL ARTERY ECHO: CPT

## 2024-07-05 NOTE — PROGRESS NOTES
US completed today for BPP/Dopplers- 8/8, WNL. Growth not assessed on this US. Pt reports growth will be assessed on US scheduled for 7/11.   IOL requested for 7/18 at 0800, per standard FGR guidelines. I am the CNM on-call on this date.   Pt accepting of IOL at [facility], though reports if EFW %ile further decreased on next US, may request [facility] [place] in the event that NICU management would be needed.   All questions answered.   Pt to keep upcoming physician consult visit with Dr Rubio on 7/12 and with myself on 7/17.

## 2024-07-06 LAB — GP B STREP GENITAL QL CULT: ABNORMAL

## 2024-07-10 NOTE — PROGRESS NOTES
Taina Claudio is a 29 y.o. at 38w0d presents for routine prenatal check.  Patient presents for MD NISHA consult for FGR   IOL scheduled with CNM's on Monday  Had BPP yesterday  C/O: Discuss FGR.     Vitals:    07/12/24 1404   BP: 122/82      Body mass index is 30.89 kg/m².     Plan:  Pt is to be induced next wk  Labor precautions given  Talked about baby size.    Jalyn Rubio, DO

## 2024-07-11 ENCOUNTER — HOSPITAL ENCOUNTER (OUTPATIENT)
Dept: RADIOLOGY | Facility: CLINIC | Age: 29
Discharge: HOME | End: 2024-07-11
Payer: COMMERCIAL

## 2024-07-11 DIAGNOSIS — O36.5930 MATERNAL CARE FOR OTHER KNOWN OR SUSPECTED POOR FETAL GROWTH, THIRD TRIMESTER, NOT APPLICABLE OR UNSPECIFIED (HHS-HCC): ICD-10-CM

## 2024-07-11 DIAGNOSIS — Z32.01 PREGNANCY TEST POSITIVE (HHS-HCC): ICD-10-CM

## 2024-07-11 PROCEDURE — 76816 OB US FOLLOW-UP PER FETUS: CPT

## 2024-07-11 PROCEDURE — 76819 FETAL BIOPHYS PROFIL W/O NST: CPT | Performed by: OBSTETRICS & GYNECOLOGY

## 2024-07-11 PROCEDURE — 76820 UMBILICAL ARTERY ECHO: CPT | Performed by: OBSTETRICS & GYNECOLOGY

## 2024-07-11 PROCEDURE — 76820 UMBILICAL ARTERY ECHO: CPT

## 2024-07-11 PROCEDURE — 76819 FETAL BIOPHYS PROFIL W/O NST: CPT

## 2024-07-11 PROCEDURE — 76816 OB US FOLLOW-UP PER FETUS: CPT | Performed by: OBSTETRICS & GYNECOLOGY

## 2024-07-12 ENCOUNTER — ROUTINE PRENATAL (OUTPATIENT)
Dept: OBSTETRICS AND GYNECOLOGY | Facility: CLINIC | Age: 29
End: 2024-07-12
Payer: COMMERCIAL

## 2024-07-12 ENCOUNTER — APPOINTMENT (OUTPATIENT)
Dept: OBSTETRICS AND GYNECOLOGY | Facility: CLINIC | Age: 29
End: 2024-07-12
Payer: COMMERCIAL

## 2024-07-12 VITALS — WEIGHT: 185.6 LBS | BODY MASS INDEX: 30.89 KG/M2 | SYSTOLIC BLOOD PRESSURE: 122 MMHG | DIASTOLIC BLOOD PRESSURE: 82 MMHG

## 2024-07-12 DIAGNOSIS — Z3A.38 38 WEEKS GESTATION OF PREGNANCY (HHS-HCC): Primary | ICD-10-CM

## 2024-07-12 PROCEDURE — 99213 OFFICE O/P EST LOW 20 MIN: CPT | Performed by: OBSTETRICS & GYNECOLOGY

## 2024-07-15 ENCOUNTER — APPOINTMENT (OUTPATIENT)
Dept: OBSTETRICS AND GYNECOLOGY | Facility: HOSPITAL | Age: 29
End: 2024-07-15
Payer: COMMERCIAL

## 2024-07-15 ENCOUNTER — ANESTHESIA EVENT (OUTPATIENT)
Dept: OBSTETRICS AND GYNECOLOGY | Facility: HOSPITAL | Age: 29
End: 2024-07-15
Payer: COMMERCIAL

## 2024-07-15 ENCOUNTER — ANESTHESIA (OUTPATIENT)
Dept: OBSTETRICS AND GYNECOLOGY | Facility: HOSPITAL | Age: 29
End: 2024-07-15
Payer: COMMERCIAL

## 2024-07-15 ENCOUNTER — HOSPITAL ENCOUNTER (INPATIENT)
Facility: HOSPITAL | Age: 29
LOS: 2 days | Discharge: HOME | End: 2024-07-17
Attending: OBSTETRICS & GYNECOLOGY | Admitting: MIDWIFE
Payer: COMMERCIAL

## 2024-07-15 DIAGNOSIS — Z34.83 PRENATAL CARE, SUBSEQUENT PREGNANCY, THIRD TRIMESTER (HHS-HCC): ICD-10-CM

## 2024-07-15 DIAGNOSIS — O36.5990 FETAL GROWTH RESTRICTION ANTEPARTUM (HHS-HCC): ICD-10-CM

## 2024-07-15 PROBLEM — Z34.90 ENCOUNTER FOR INDUCTION OF LABOR (HHS-HCC): Status: ACTIVE | Noted: 2024-07-15

## 2024-07-15 PROBLEM — G56.03 BILATERAL CARPAL TUNNEL SYNDROME: Status: ACTIVE | Noted: 2024-07-15

## 2024-07-15 PROBLEM — K21.9 GASTROESOPHAGEAL REFLUX DISEASE: Status: ACTIVE | Noted: 2024-07-15

## 2024-07-15 LAB
ABO GROUP (TYPE) IN BLOOD: NORMAL
ANTIBODY SCREEN: NORMAL
ERYTHROCYTE [DISTWIDTH] IN BLOOD BY AUTOMATED COUNT: 13.6 % (ref 11.5–14.5)
HCT VFR BLD AUTO: 37.5 % (ref 36–46)
HGB BLD-MCNC: 12.6 G/DL (ref 12–16)
MCH RBC QN AUTO: 29.2 PG (ref 26–34)
MCHC RBC AUTO-ENTMCNC: 33.6 G/DL (ref 32–36)
MCV RBC AUTO: 87 FL (ref 80–100)
NRBC BLD-RTO: 0 /100 WBCS (ref 0–0)
PLATELET # BLD AUTO: 257 X10*3/UL (ref 150–450)
RBC # BLD AUTO: 4.31 X10*6/UL (ref 4–5.2)
RH FACTOR (ANTIGEN D): NORMAL
TREPONEMA PALLIDUM IGG+IGM AB [PRESENCE] IN SERUM OR PLASMA BY IMMUNOASSAY: NONREACTIVE
WBC # BLD AUTO: 12.7 X10*3/UL (ref 4.4–11.3)

## 2024-07-15 PROCEDURE — 3E033VJ INTRODUCTION OF OTHER HORMONE INTO PERIPHERAL VEIN, PERCUTANEOUS APPROACH: ICD-10-PCS | Performed by: MIDWIFE

## 2024-07-15 PROCEDURE — 10907ZC DRAINAGE OF AMNIOTIC FLUID, THERAPEUTIC FROM PRODUCTS OF CONCEPTION, VIA NATURAL OR ARTIFICIAL OPENING: ICD-10-PCS | Performed by: MIDWIFE

## 2024-07-15 PROCEDURE — 2500000002 HC RX 250 W HCPCS SELF ADMINISTERED DRUGS (ALT 637 FOR MEDICARE OP, ALT 636 FOR OP/ED)

## 2024-07-15 PROCEDURE — 86780 TREPONEMA PALLIDUM: CPT | Mod: STJLAB | Performed by: MIDWIFE

## 2024-07-15 PROCEDURE — 3700000014 EPIDURAL BLOCK: Performed by: NURSE ANESTHETIST, CERTIFIED REGISTERED

## 2024-07-15 PROCEDURE — 59050 FETAL MONITOR W/REPORT: CPT

## 2024-07-15 PROCEDURE — 36415 COLL VENOUS BLD VENIPUNCTURE: CPT

## 2024-07-15 PROCEDURE — 1120000001 HC OB PRIVATE ROOM DAILY

## 2024-07-15 PROCEDURE — 7210000002 HC LABOR PER HOUR

## 2024-07-15 PROCEDURE — 59409 OBSTETRICAL CARE: CPT | Performed by: MIDWIFE

## 2024-07-15 PROCEDURE — 86850 RBC ANTIBODY SCREEN: CPT

## 2024-07-15 PROCEDURE — 2500000004 HC RX 250 GENERAL PHARMACY W/ HCPCS (ALT 636 FOR OP/ED): Performed by: MIDWIFE

## 2024-07-15 PROCEDURE — 36415 COLL VENOUS BLD VENIPUNCTURE: CPT | Performed by: MIDWIFE

## 2024-07-15 PROCEDURE — 85027 COMPLETE CBC AUTOMATED: CPT

## 2024-07-15 PROCEDURE — 2500000004 HC RX 250 GENERAL PHARMACY W/ HCPCS (ALT 636 FOR OP/ED): Performed by: NURSE ANESTHETIST, CERTIFIED REGISTERED

## 2024-07-15 PROCEDURE — 94640 AIRWAY INHALATION TREATMENT: CPT

## 2024-07-15 PROCEDURE — 2500000005 HC RX 250 GENERAL PHARMACY W/O HCPCS: Performed by: NURSE ANESTHETIST, CERTIFIED REGISTERED

## 2024-07-15 PROCEDURE — 2500000004 HC RX 250 GENERAL PHARMACY W/ HCPCS (ALT 636 FOR OP/ED)

## 2024-07-15 PROCEDURE — 7100000016 HC LABOR RECOVERY PER HOUR

## 2024-07-15 RX ORDER — OXYTOCIN 10 [USP'U]/ML
10 INJECTION, SOLUTION INTRAMUSCULAR; INTRAVENOUS ONCE AS NEEDED
Status: DISCONTINUED | OUTPATIENT
Start: 2024-07-15 | End: 2024-07-17 | Stop reason: HOSPADM

## 2024-07-15 RX ORDER — PENICILLIN G 3000000 [IU]/50ML
3 INJECTION, SOLUTION INTRAVENOUS EVERY 4 HOURS
Status: DISCONTINUED | OUTPATIENT
Start: 2024-07-15 | End: 2024-07-16

## 2024-07-15 RX ORDER — HYDRALAZINE HYDROCHLORIDE 20 MG/ML
5 INJECTION INTRAMUSCULAR; INTRAVENOUS ONCE AS NEEDED
Status: DISCONTINUED | OUTPATIENT
Start: 2024-07-15 | End: 2024-07-15 | Stop reason: ALTCHOICE

## 2024-07-15 RX ORDER — LIDOCAINE 560 MG/1
1 PATCH PERCUTANEOUS; TOPICAL; TRANSDERMAL
Status: DISCONTINUED | OUTPATIENT
Start: 2024-07-15 | End: 2024-07-17 | Stop reason: HOSPADM

## 2024-07-15 RX ORDER — DIPHENHYDRAMINE HCL 25 MG
25 TABLET ORAL EVERY 6 HOURS PRN
Status: DISCONTINUED | OUTPATIENT
Start: 2024-07-15 | End: 2024-07-17 | Stop reason: HOSPADM

## 2024-07-15 RX ORDER — LOPERAMIDE HYDROCHLORIDE 2 MG/1
4 CAPSULE ORAL EVERY 2 HOUR PRN
Status: DISCONTINUED | OUTPATIENT
Start: 2024-07-15 | End: 2024-07-15 | Stop reason: ALTCHOICE

## 2024-07-15 RX ORDER — OXYTOCIN 10 [USP'U]/ML
10 INJECTION, SOLUTION INTRAMUSCULAR; INTRAVENOUS ONCE AS NEEDED
Status: DISCONTINUED | OUTPATIENT
Start: 2024-07-15 | End: 2024-07-15 | Stop reason: ALTCHOICE

## 2024-07-15 RX ORDER — ALBUTEROL SULFATE 0.83 MG/ML
2.5 SOLUTION RESPIRATORY (INHALATION) EVERY 6 HOURS PRN
Status: DISCONTINUED | OUTPATIENT
Start: 2024-07-15 | End: 2024-07-17 | Stop reason: HOSPADM

## 2024-07-15 RX ORDER — HYDRALAZINE HYDROCHLORIDE 20 MG/ML
5 INJECTION INTRAMUSCULAR; INTRAVENOUS ONCE AS NEEDED
Status: DISCONTINUED | OUTPATIENT
Start: 2024-07-15 | End: 2024-07-17 | Stop reason: HOSPADM

## 2024-07-15 RX ORDER — NIFEDIPINE 10 MG/1
10 CAPSULE ORAL ONCE AS NEEDED
Status: DISCONTINUED | OUTPATIENT
Start: 2024-07-15 | End: 2024-07-15 | Stop reason: ALTCHOICE

## 2024-07-15 RX ORDER — ADHESIVE BANDAGE
10 BANDAGE TOPICAL
Status: DISCONTINUED | OUTPATIENT
Start: 2024-07-15 | End: 2024-07-17 | Stop reason: HOSPADM

## 2024-07-15 RX ORDER — METHYLERGONOVINE MALEATE 0.2 MG/ML
0.2 INJECTION INTRAVENOUS ONCE AS NEEDED
Status: DISCONTINUED | OUTPATIENT
Start: 2024-07-15 | End: 2024-07-15 | Stop reason: ALTCHOICE

## 2024-07-15 RX ORDER — OXYTOCIN/0.9 % SODIUM CHLORIDE 30/500 ML
60 PLASTIC BAG, INJECTION (ML) INTRAVENOUS ONCE AS NEEDED
Status: DISCONTINUED | OUTPATIENT
Start: 2024-07-15 | End: 2024-07-17 | Stop reason: HOSPADM

## 2024-07-15 RX ORDER — TERBUTALINE SULFATE 1 MG/ML
0.25 INJECTION SUBCUTANEOUS ONCE AS NEEDED
Status: DISCONTINUED | OUTPATIENT
Start: 2024-07-15 | End: 2024-07-16 | Stop reason: HOSPADM

## 2024-07-15 RX ORDER — POLYETHYLENE GLYCOL 3350 17 G/17G
17 POWDER, FOR SOLUTION ORAL 2 TIMES DAILY PRN
Status: DISCONTINUED | OUTPATIENT
Start: 2024-07-15 | End: 2024-07-17 | Stop reason: HOSPADM

## 2024-07-15 RX ORDER — IBUPROFEN 600 MG/1
600 TABLET ORAL EVERY 6 HOURS
Status: DISCONTINUED | OUTPATIENT
Start: 2024-07-15 | End: 2024-07-17 | Stop reason: HOSPADM

## 2024-07-15 RX ORDER — LIDOCAINE HYDROCHLORIDE 10 MG/ML
30 INJECTION INFILTRATION; PERINEURAL ONCE AS NEEDED
Status: DISCONTINUED | OUTPATIENT
Start: 2024-07-15 | End: 2024-07-16 | Stop reason: HOSPADM

## 2024-07-15 RX ORDER — ONDANSETRON 4 MG/1
4 TABLET, FILM COATED ORAL EVERY 6 HOURS PRN
Status: DISCONTINUED | OUTPATIENT
Start: 2024-07-15 | End: 2024-07-15 | Stop reason: ALTCHOICE

## 2024-07-15 RX ORDER — FAMOTIDINE 20 MG/1
20 TABLET, FILM COATED ORAL EVERY 12 HOURS PRN
Status: DISCONTINUED | OUTPATIENT
Start: 2024-07-15 | End: 2024-07-17 | Stop reason: HOSPADM

## 2024-07-15 RX ORDER — CARBOPROST TROMETHAMINE 250 UG/ML
250 INJECTION, SOLUTION INTRAMUSCULAR ONCE AS NEEDED
Status: DISCONTINUED | OUTPATIENT
Start: 2024-07-15 | End: 2024-07-15 | Stop reason: ALTCHOICE

## 2024-07-15 RX ORDER — LIDOCAINE HYDROCHLORIDE 20 MG/ML
INJECTION, SOLUTION EPIDURAL; INFILTRATION; INTRACAUDAL; PERINEURAL AS NEEDED
Status: DISCONTINUED | OUTPATIENT
Start: 2024-07-15 | End: 2024-07-15

## 2024-07-15 RX ORDER — OXYTOCIN/0.9 % SODIUM CHLORIDE 30/500 ML
2-30 PLASTIC BAG, INJECTION (ML) INTRAVENOUS CONTINUOUS
Status: DISCONTINUED | OUTPATIENT
Start: 2024-07-15 | End: 2024-07-17 | Stop reason: HOSPADM

## 2024-07-15 RX ORDER — FENTANYL/ROPIVACAINE/NS/PF 2MCG/ML-.2
PLASTIC BAG, INJECTION (ML) INJECTION
Status: COMPLETED
Start: 2024-07-15 | End: 2024-07-15

## 2024-07-15 RX ORDER — FENTANYL/ROPIVACAINE/NS/PF 2MCG/ML-.2
0-25 PLASTIC BAG, INJECTION (ML) INJECTION CONTINUOUS
Status: DISCONTINUED | OUTPATIENT
Start: 2024-07-15 | End: 2024-07-17 | Stop reason: HOSPADM

## 2024-07-15 RX ORDER — TRANEXAMIC ACID 100 MG/ML
1000 INJECTION, SOLUTION INTRAVENOUS ONCE AS NEEDED
Status: DISCONTINUED | OUTPATIENT
Start: 2024-07-15 | End: 2024-07-17 | Stop reason: HOSPADM

## 2024-07-15 RX ORDER — BUDESONIDE 0.5 MG/2ML
0.5 INHALANT ORAL
Status: DISCONTINUED | OUTPATIENT
Start: 2024-07-15 | End: 2024-07-17 | Stop reason: HOSPADM

## 2024-07-15 RX ORDER — FLUTICASONE PROPIONATE 50 MCG
2 SPRAY, SUSPENSION (ML) NASAL DAILY
Status: DISCONTINUED | OUTPATIENT
Start: 2024-07-15 | End: 2024-07-17 | Stop reason: HOSPADM

## 2024-07-15 RX ORDER — ACETAMINOPHEN 325 MG/1
975 TABLET ORAL EVERY 6 HOURS
Status: DISCONTINUED | OUTPATIENT
Start: 2024-07-15 | End: 2024-07-17 | Stop reason: HOSPADM

## 2024-07-15 RX ORDER — METOCLOPRAMIDE HYDROCHLORIDE 5 MG/ML
10 INJECTION INTRAMUSCULAR; INTRAVENOUS EVERY 6 HOURS PRN
Status: DISCONTINUED | OUTPATIENT
Start: 2024-07-15 | End: 2024-07-17 | Stop reason: HOSPADM

## 2024-07-15 RX ORDER — SIMETHICONE 80 MG
80 TABLET,CHEWABLE ORAL 4 TIMES DAILY PRN
Status: DISCONTINUED | OUTPATIENT
Start: 2024-07-15 | End: 2024-07-17 | Stop reason: HOSPADM

## 2024-07-15 RX ORDER — ONDANSETRON HYDROCHLORIDE 2 MG/ML
4 INJECTION, SOLUTION INTRAVENOUS EVERY 6 HOURS PRN
Status: DISCONTINUED | OUTPATIENT
Start: 2024-07-15 | End: 2024-07-17 | Stop reason: HOSPADM

## 2024-07-15 RX ORDER — MISOPROSTOL 200 UG/1
800 TABLET ORAL ONCE AS NEEDED
Status: DISCONTINUED | OUTPATIENT
Start: 2024-07-15 | End: 2024-07-17 | Stop reason: HOSPADM

## 2024-07-15 RX ORDER — LABETALOL HYDROCHLORIDE 5 MG/ML
20 INJECTION, SOLUTION INTRAVENOUS ONCE AS NEEDED
Status: DISCONTINUED | OUTPATIENT
Start: 2024-07-15 | End: 2024-07-15 | Stop reason: ALTCHOICE

## 2024-07-15 RX ORDER — TRANEXAMIC ACID 100 MG/ML
1000 INJECTION, SOLUTION INTRAVENOUS ONCE AS NEEDED
Status: DISCONTINUED | OUTPATIENT
Start: 2024-07-15 | End: 2024-07-15 | Stop reason: ALTCHOICE

## 2024-07-15 RX ORDER — ONDANSETRON HYDROCHLORIDE 2 MG/ML
4 INJECTION, SOLUTION INTRAVENOUS EVERY 6 HOURS PRN
Status: DISCONTINUED | OUTPATIENT
Start: 2024-07-15 | End: 2024-07-15 | Stop reason: ALTCHOICE

## 2024-07-15 RX ORDER — CARBOPROST TROMETHAMINE 250 UG/ML
250 INJECTION, SOLUTION INTRAMUSCULAR ONCE AS NEEDED
Status: DISCONTINUED | OUTPATIENT
Start: 2024-07-15 | End: 2024-07-17 | Stop reason: HOSPADM

## 2024-07-15 RX ORDER — OXYTOCIN/0.9 % SODIUM CHLORIDE 30/500 ML
60 PLASTIC BAG, INJECTION (ML) INTRAVENOUS ONCE AS NEEDED
Status: DISCONTINUED | OUTPATIENT
Start: 2024-07-15 | End: 2024-07-15 | Stop reason: ALTCHOICE

## 2024-07-15 RX ORDER — ALBUTEROL SULFATE 90 UG/1
2 AEROSOL, METERED RESPIRATORY (INHALATION) EVERY 6 HOURS PRN
Status: DISCONTINUED | OUTPATIENT
Start: 2024-07-15 | End: 2024-07-15 | Stop reason: ALTCHOICE

## 2024-07-15 RX ORDER — DIPHENHYDRAMINE HYDROCHLORIDE 50 MG/ML
25 INJECTION INTRAMUSCULAR; INTRAVENOUS EVERY 6 HOURS PRN
Status: DISCONTINUED | OUTPATIENT
Start: 2024-07-15 | End: 2024-07-17 | Stop reason: HOSPADM

## 2024-07-15 RX ORDER — LABETALOL HYDROCHLORIDE 5 MG/ML
20 INJECTION, SOLUTION INTRAVENOUS ONCE AS NEEDED
Status: DISCONTINUED | OUTPATIENT
Start: 2024-07-15 | End: 2024-07-17 | Stop reason: HOSPADM

## 2024-07-15 RX ORDER — SODIUM CHLORIDE, SODIUM LACTATE, POTASSIUM CHLORIDE, CALCIUM CHLORIDE 600; 310; 30; 20 MG/100ML; MG/100ML; MG/100ML; MG/100ML
125 INJECTION, SOLUTION INTRAVENOUS CONTINUOUS
Status: DISCONTINUED | OUTPATIENT
Start: 2024-07-15 | End: 2024-07-17 | Stop reason: HOSPADM

## 2024-07-15 RX ORDER — METHYLERGONOVINE MALEATE 0.2 MG/ML
0.2 INJECTION INTRAVENOUS ONCE AS NEEDED
Status: DISCONTINUED | OUTPATIENT
Start: 2024-07-15 | End: 2024-07-17 | Stop reason: HOSPADM

## 2024-07-15 RX ORDER — MISOPROSTOL 200 UG/1
800 TABLET ORAL ONCE AS NEEDED
Status: DISCONTINUED | OUTPATIENT
Start: 2024-07-15 | End: 2024-07-15 | Stop reason: ALTCHOICE

## 2024-07-15 RX ORDER — ONDANSETRON 4 MG/1
4 TABLET, FILM COATED ORAL EVERY 6 HOURS PRN
Status: DISCONTINUED | OUTPATIENT
Start: 2024-07-15 | End: 2024-07-17 | Stop reason: HOSPADM

## 2024-07-15 RX ORDER — NIFEDIPINE 10 MG/1
10 CAPSULE ORAL ONCE AS NEEDED
Status: DISCONTINUED | OUTPATIENT
Start: 2024-07-15 | End: 2024-07-17 | Stop reason: HOSPADM

## 2024-07-15 RX ORDER — LOPERAMIDE HYDROCHLORIDE 2 MG/1
4 CAPSULE ORAL EVERY 2 HOUR PRN
Status: DISCONTINUED | OUTPATIENT
Start: 2024-07-15 | End: 2024-07-17 | Stop reason: HOSPADM

## 2024-07-15 RX ORDER — BISACODYL 10 MG/1
10 SUPPOSITORY RECTAL DAILY PRN
Status: DISCONTINUED | OUTPATIENT
Start: 2024-07-15 | End: 2024-07-17 | Stop reason: HOSPADM

## 2024-07-15 RX ORDER — METOCLOPRAMIDE 10 MG/1
10 TABLET ORAL EVERY 6 HOURS PRN
Status: DISCONTINUED | OUTPATIENT
Start: 2024-07-15 | End: 2024-07-17 | Stop reason: HOSPADM

## 2024-07-15 SDOH — HEALTH STABILITY: MENTAL HEALTH: HAVE YOU USED ANY SUBSTANCES (CANABIS, COCAINE, HEROIN, HALLUCINOGENS, INHALANTS, ETC.) IN THE PAST 12 MONTHS?: NO

## 2024-07-15 SDOH — SOCIAL STABILITY: SOCIAL INSECURITY: DOES ANYONE TRY TO KEEP YOU FROM HAVING/CONTACTING OTHER FRIENDS OR DOING THINGS OUTSIDE YOUR HOME?: NO

## 2024-07-15 SDOH — SOCIAL STABILITY: SOCIAL INSECURITY: HAS ANYONE EVER THREATENED TO HURT YOUR FAMILY OR YOUR PETS?: NO

## 2024-07-15 SDOH — HEALTH STABILITY: MENTAL HEALTH: WERE YOU ABLE TO COMPLETE ALL THE BEHAVIORAL HEALTH SCREENINGS?: YES

## 2024-07-15 SDOH — SOCIAL STABILITY: SOCIAL INSECURITY: PHYSICAL ABUSE: DENIES

## 2024-07-15 SDOH — ECONOMIC STABILITY: HOUSING INSECURITY: DO YOU FEEL UNSAFE GOING BACK TO THE PLACE WHERE YOU ARE LIVING?: NO

## 2024-07-15 SDOH — HEALTH STABILITY: MENTAL HEALTH: HAVE YOU USED ANY PRESCRIPTION DRUGS OTHER THAN PRESCRIBED IN THE PAST 12 MONTHS?: NO

## 2024-07-15 SDOH — SOCIAL STABILITY: SOCIAL INSECURITY: HAVE YOU HAD ANY THOUGHTS OF HARMING ANYONE ELSE?: NO

## 2024-07-15 SDOH — HEALTH STABILITY: MENTAL HEALTH: SUICIDAL BEHAVIOR (LIFETIME): NO

## 2024-07-15 SDOH — HEALTH STABILITY: MENTAL HEALTH: WISH TO BE DEAD (PAST 1 MONTH): NO

## 2024-07-15 SDOH — HEALTH STABILITY: MENTAL HEALTH: NON-SPECIFIC ACTIVE SUICIDAL THOUGHTS (PAST 1 MONTH): NO

## 2024-07-15 SDOH — SOCIAL STABILITY: SOCIAL INSECURITY: ARE YOU OR HAVE YOU BEEN THREATENED OR ABUSED PHYSICALLY, EMOTIONALLY, OR SEXUALLY BY ANYONE?: NO

## 2024-07-15 SDOH — SOCIAL STABILITY: SOCIAL INSECURITY: ABUSE SCREEN: ADULT

## 2024-07-15 SDOH — HEALTH STABILITY: MENTAL HEALTH: CURRENT SMOKER: 0

## 2024-07-15 SDOH — SOCIAL STABILITY: SOCIAL INSECURITY: VERBAL ABUSE: DENIES

## 2024-07-15 SDOH — SOCIAL STABILITY: SOCIAL INSECURITY: ARE THERE ANY APPARENT SIGNS OF INJURIES/BEHAVIORS THAT COULD BE RELATED TO ABUSE/NEGLECT?: NO

## 2024-07-15 SDOH — SOCIAL STABILITY: SOCIAL INSECURITY: HAVE YOU HAD THOUGHTS OF HARMING ANYONE ELSE?: YES

## 2024-07-15 SDOH — SOCIAL STABILITY: SOCIAL INSECURITY: DO YOU FEEL ANYONE HAS EXPLOITED OR TAKEN ADVANTAGE OF YOU FINANCIALLY OR OF YOUR PERSONAL PROPERTY?: NO

## 2024-07-15 ASSESSMENT — PAIN SCALES - GENERAL
PAINLEVEL_OUTOF10: 0 - NO PAIN
PAINLEVEL_OUTOF10: 9
PAINLEVEL_OUTOF10: 7
PAINLEVEL_OUTOF10: 0 - NO PAIN
PAINLEVEL_OUTOF10: 9
PAINLEVEL_OUTOF10: 0 - NO PAIN
PAINLEVEL_OUTOF10: 8
PAINLEVEL_OUTOF10: 0 - NO PAIN
PAINLEVEL_OUTOF10: 8
PAINLEVEL_OUTOF10: 0 - NO PAIN
PAINLEVEL_OUTOF10: 7
PAINLEVEL_OUTOF10: 0 - NO PAIN

## 2024-07-15 ASSESSMENT — ACTIVITIES OF DAILY LIVING (ADL)
BATHING: INDEPENDENT
GROOMING: INDEPENDENT
JUDGMENT_ADEQUATE_SAFELY_COMPLETE_DAILY_ACTIVITIES: YES
LACK_OF_TRANSPORTATION: NO
TOILETING: INDEPENDENT
DRESSING YOURSELF: INDEPENDENT
FEEDING YOURSELF: INDEPENDENT
HEARING - LEFT EAR: FUNCTIONAL
WALKS IN HOME: INDEPENDENT
HEARING - RIGHT EAR: FUNCTIONAL
PATIENT'S MEMORY ADEQUATE TO SAFELY COMPLETE DAILY ACTIVITIES?: YES
ADEQUATE_TO_COMPLETE_ADL: YES

## 2024-07-15 ASSESSMENT — LIFESTYLE VARIABLES
HOW OFTEN DO YOU HAVE A DRINK CONTAINING ALCOHOL: NEVER
HOW MANY STANDARD DRINKS CONTAINING ALCOHOL DO YOU HAVE ON A TYPICAL DAY: PATIENT DOES NOT DRINK
HOW OFTEN DO YOU HAVE 6 OR MORE DRINKS ON ONE OCCASION: NEVER
SKIP TO QUESTIONS 9-10: 1
AUDIT-C TOTAL SCORE: 0
AUDIT-C TOTAL SCORE: 0

## 2024-07-15 ASSESSMENT — PATIENT HEALTH QUESTIONNAIRE - PHQ9
1. LITTLE INTEREST OR PLEASURE IN DOING THINGS: NOT AT ALL
SUM OF ALL RESPONSES TO PHQ9 QUESTIONS 1 & 2: 0
2. FEELING DOWN, DEPRESSED OR HOPELESS: NOT AT ALL

## 2024-07-15 NOTE — ANESTHESIA PREPROCEDURE EVALUATION
"Patient: Taina Claudio    Evaluation Method: In-person visit    Procedure Information    Date: 07/15/24  Procedure: Labor Analgesia        38w3d 29 y.o. IOL due to FGR --> EFW 5lbs 11oz per  .  Patient has asthma exacerbated by pregnancy and allergies.     /78   Pulse 102   Temp 36.5 °C (97.7 °F) (Oral)   Resp 18   Ht 1.651 m (5' 5\")   Wt 85.5 kg (188 lb 7.9 oz)   LMP 10/20/2023   SpO2 98%   BMI 31.37 kg/m²     Relevant Problems   Pulmonary   (+) Moderate persistent asthma (HHS-HCC)      Neuro   (+) Bilateral carpal tunnel syndrome   (+) Depressive disorder      GI   (+) Gastroesophageal reflux disease      Musculoskeletal   (+) Bilateral carpal tunnel syndrome      ID   (+) Small intestinal bacterial overgrowth       Clinical information reviewed:   Tobacco  Allergies  Meds   Med Hx  Surg Hx   Fam Hx          NPO Detail: Per L&D protocol  No data recorded     OB/Gyn Evaluation    Present Pregnancy    Patient is pregnant now.   Obstetric History                Physical Exam    Airway  Mallampati: I  TM distance: >3 FB  Neck ROM: full     Cardiovascular   Rhythm: regular  Rate: abnormal  Comments: tachycardia   Dental - normal exam     Pulmonary   Breath sounds clear to auscultation     Abdominal     Comments: gravid           Anesthesia Plan    History of general anesthesia?: yes  History of complications of general anesthesia?: no    ASA 2     epidural   (Patient has no history of problems with anesthesia  Patient has no family history of problems with anesthesia     I informed and discussed the risks and benefits of general, spinal and epidural anesthesia with the patient.  The patient expressed her understanding and her questions were answered.  A verbal consent was given by the patient.  )  The patient is not a current smoker.    Anesthetic plan and risks discussed with patient.  Use of blood products discussed with patient who consented to blood products.        "

## 2024-07-15 NOTE — HOSPITAL COURSE
History of Present Illness  Taina Claudio is a 29 y.o.  at 38w3d. REMEDIOS: 2024, by Last Menstrual Period. Estimated fetal weight: 5lbs 11oz. She has had prenatal care with [facility] .     Chief Complaint: Scheduled Induction for FGR           Assessment/Plan  A: 28yo  at 38.3 per LMP c/w 8 week US      Category 1 FHR      FGR --> EFW 5lbs 11oz per  US; WNL dopplers      GBS+      Failed 1-hr, passed 3-hr      Asthma      S/P Tdap and covid series immunizations       rrNIPS --> it's a boy!      H/O abdominal liposuction       Vertex per bedside US    P: Routine admission labs/orders      CEFM      GBS prophylaxis per guidelines      Pain relief measures PRN      Encourage maternal positioning for labor/comfort      Pitocin titration per guidelines      Will consider AROM when appropriate      Dr. Mendez aware of admission, POC      Anticipate      0930: 2.5/-3  1018: Pitocin started  1443: Arom, clear; /-2  : /-1  2155: Epidural  2224: /93 noted  2235: 10/10/+1  2246: Liveborn male, intact perineum 80cc blood loss, placenta sent to pathology for FGR

## 2024-07-15 NOTE — PROGRESS NOTES
S: Patient reports feeling regular, consistent contractions mainly in the front.    O: SVE: 2.5/80/-2      [place]: Sara q2-3      FHR: 125, moderate variability ,+accels, no decels    A: 28yo  at 38.3 per LMP c/w 8 week US      Category 1 FHR      FGR --> EFW 5lbs 11oz per  US; WNL dopplers      GBS+      Failed 1-hr, passed 3-hr      Asthma      S/P Tdap and covid series immunizations       rrNIPS --> it's a boy!      H/O abdominal liposuction       Vertex per bedside US     P: Arom'd for clear fluid with patient consent      CEFM      GBS prophylaxis per guidelines --> adequately treated      Pain relief measures PRN      Encourage maternal positioning for labor/comfort      Pitocin titration per guidelines --> currently at 16mu      Anticipate

## 2024-07-15 NOTE — CARE PLAN
Problem: Vaginal Birth or  Section  Goal: Fetal and maternal status remain reassuring during the birth process  Outcome: Progressing  Flowsheets (Taken 7/15/2024 1951)  Fetal and maternal status remain reassuring during the birth process:   Monitor vital signs   Monitor fetal heart rate   Monitor uterine activity   Monitor labor progression (Vaginal delivery)  Goal: Prevention of malpresentation/labor dystocia through delivery  Outcome: Progressing  Flowsheets (Taken 7/15/2024 1951)  Prevention of malpresentation/labor dystocia through delivery: Positioning, turning, and/or use of birthing ball assistance  Goal: Demonstrates labor coping techniques through delivery  Outcome: Progressing  Flowsheets (Taken 7/15/2024 1951)  Demonstrates labor coping techniques through delivery:   Positioning, turning, and/or use of birthing ball assistance   Breathing/relaxation assistance  Goal: Minimal s/sx of HDP and BP<160/110  Outcome: Progressing  Flowsheets (Taken 7/15/2024 1951)  Minimal s/sx of HDP and BP <160/110:   Med administration/monitoring of effect   Monitor QBL and vital signs  Goal: No s/sx of infection through recovery  Outcome: Progressing  Flowsheets (Taken 7/15/2024 1951)  No s/sx of infection through recovery:   Hygiene practices/suleman-care   Monitor QBL and vital signs  Goal: No s/sx of hemorrhage through recovery  Outcome: Progressing  Flowsheets (Taken 7/15/2024 1951)  No s/sx of hemorrhage through recovery: Monitor QBL and vital signs   The patient's goals for the shift include safe labor and delivery of infant    The clinical goals for the shift include Safe labor and delivery of infant

## 2024-07-15 NOTE — H&P
Obstetrical Admission History and Physical     Taina Claudio is a 29 y.o.  at 38w3d. REMEDIOS: 2024, by Last Menstrual Period. Estimated fetal weight: 5lbs 11oz. She has had prenatal care with [facility] .    Chief Complaint: Scheduled Induction for FGR    Assessment/Plan    A: 30yo  at 38.3 per LMP c/w 8 week US      Category 1 FHR      FGR --> EFW 5lbs 11oz per  US; WNL dopplers      GBS+      Failed 1-hr, passed 3-hr      Asthma      S/P Tdap and covid series immunizations       rrNIPS --> it's a boy!      H/O abdominal liposuction       Vertex per bedside US    P: Routine admission labs/orders      CEFM      GBS prophylaxis per guidelines      Pain relief measures PRN      Encourage maternal positioning for labor/comfort      Pitocin titration per guidelines      Will consider AROM when appropriate      Dr. Mendez aware of admission, POC      Anticipate      Principal Problem:    Encounter for induction of labor ([facility])      Pregnancy Problems (from 23 to present)       Problem Noted Resolved    Encounter for induction of labor ([facility]) 7/15/2024 by STARLA Ames No    Priority:  Medium      Prenatal care of multigravida, antepartum ([facility]) 2024 by STARLA Hunt No    Priority:  Medium      Overview Addendum 2024  8:40 AM by STARLA Hunt     -- h/o 2 NSVDs at 36.5 and 37.0 weeks and EAB x 1.   - Covid vaccinated x 3  - Declined flu vaccine   - Tdap given 5/3/24   - rrNIPS, having a boy!   - 1 hour GTT elevated, 3 hour GTT WNL.   - GBS positive, NKDA          Fetal growth restriction antepartum ([facility]) 2024 by STARLA Hunt No    Priority:  Medium      Overview Addendum 2024  2:45 PM by STARLA Hunt     - Noted on 30.6 week US, by AC alone (7%). EFW 19%.  - On US done , now FGR by both AC and EFW (4%). Given this new finding, physician consult visit scheduled.   - Recommendation per MFM-  weekly BPP/Dopplers, biometry US at 37 weeks for delivery planning  - Per Dr Singh, formal physician consult visit not needed, given FGR by AC alone.   - History of abdominal liposuction in .                Options for delivery have been discussed with the patient and she elects for an induction of labor.  Cervical ripening with cytotec, cervidil, other prostaglandin agents has been discussed.  Induction of labor with pitocin, amniotomy, cytotec, and cervical balloon have been discussed in detail. The risks, benefits, complications, alternatives, expected outcomes, potential problems during recuperation and recovery, and the risks of not performing the procedure were discussed with the patient. The patient stated understanding that the risks of delivery include, but are not limited to: death; reaction to medications; injury to bowel, bladder, ureters, uterus, cervix, vagina, and other pelvic and abdominal structures, infection; blood loss and possible need for transfusion; and potential need for surgery, including hysterectomy. The risks of injury to the infant during delivery were also discussed. All questions were answered. There was concurrence with the planned procedure, and the patient wanted to proceed.    Admit to inpatient status. I anticipate that this patient will require a stay exceeding at least 2 midnights for delivery and postpartum.  Induction of labor.  Management of pregnancy complications, as indicated.    Subjective   Good fetal movement. Denies vaginal bleeding., Denies contractions., Denies leaking of fluid.       Reason for Induction of Labor:  Intrauterine growth restriction, documented by serial ultrasounds       Obstetrical History   OB History    Para Term  AB Living   4 2 2 0 1 2   SAB IAB Ectopic Multiple Live Births   0 1 0 0 2      # Outcome Date GA Lbr Eleno/2nd Weight Sex Type Anes PTL Lv   4 Current            3 Term 13 36w5d  2.75 kg M Vag-Spont   NICKIE   2 Term  09/07/11 37w0d  2.75 kg M    NICKIE   1 IAB  5w0d    1st Tri Surg          Past Medical History  Past Medical History:   Diagnosis Date    Depression, unspecified 09/16/2016    Depressed state        Past Surgical History   No past surgical history on file.    Social History  Social History     Tobacco Use    Smoking status: Never    Smokeless tobacco: Never   Substance Use Topics    Alcohol use: Not Currently     Substance and Sexual Activity   Drug Use Never       Allergies  Gluten     Medications  Medications Prior to Admission   Medication Sig Dispense Refill Last Dose    albuterol 90 mcg/actuation inhaler Inhale 2 puffs every 6 hours if needed for wheezing. 18 g 1 7/14/2024 at 1800    famotidine (Pepcid) 20 mg tablet Take 1 tablet (20 mg) by mouth every 12 hours if needed.   7/14/2024 at 1800    fluticasone (Flovent) 220 mcg/actuation inhaler Inhale 2 puffs 2 times a day. Rinse mouth with water after use to reduce aftertaste and incidence of candidiasis. Do not swallow. 12 g 5 Past Month    prenatal vit37/iron/folic acid (PRENATA ORAL) Take by mouth.   7/15/2024    fluticasone (Flonase) 50 mcg/actuation nasal spray ADMINISTER 2 SPRAYS INTO EACH NOSTRIL ONCE DAILY. SHAKE GENTLY. BEFORE FIRST USE, PRIME PUMP. AFTER USE, CLEAN TIP AND REPLACE CAP. (Patient not taking: Reported on 7/15/2024) 48 mL 2 More than a month       Objective    Last Vitals  Temp Pulse Resp BP MAP O2 Sat   36.5 °C (97.7 °F) 103 18 129/77   97 %     Physical Examination  GENERAL: Examination reveals a well developed, well nourished, gravid female in no acute distress. She is alert and cooperative.  [facility] reading:  Infrequent uterine activity. Patient denies feeling contractions.  The fetus is in a vertex presentation, determined by ultrasound and vaginal exam  VAGINA: normal appearing vagina with normal color and discharge and no lesions noted  CERVIX:  2.5/60/-3, posterior, soft ; MEMBRANES are Intact  NEUROLOGICAL: alert, oriented, normal  speech, no focal findings or movement disorder noted, DTRs normal and symmetrical  PSYCHOLOGICAL: awake and alert; oriented to person, place, and time    Lab Review  Labs in chart were reviewed.

## 2024-07-16 PROCEDURE — 1220000001 HC OB SEMI-PRIVATE ROOM DAILY

## 2024-07-16 PROCEDURE — 94640 AIRWAY INHALATION TREATMENT: CPT

## 2024-07-16 PROCEDURE — 2500000002 HC RX 250 W HCPCS SELF ADMINISTERED DRUGS (ALT 637 FOR MEDICARE OP, ALT 636 FOR OP/ED)

## 2024-07-16 PROCEDURE — 2500000001 HC RX 250 WO HCPCS SELF ADMINISTERED DRUGS (ALT 637 FOR MEDICARE OP): Performed by: MIDWIFE

## 2024-07-16 ASSESSMENT — PAIN SCALES - GENERAL
PAIN_LEVEL: 0
PAINLEVEL_OUTOF10: 1
PAINLEVEL_OUTOF10: 0 - NO PAIN
PAINLEVEL_OUTOF10: 2
PAINLEVEL_OUTOF10: 0 - NO PAIN
PAINLEVEL_OUTOF10: 3
PAINLEVEL_OUTOF10: 2
PAINLEVEL_OUTOF10: 0 - NO PAIN
PAINLEVEL_OUTOF10: 0 - NO PAIN
PAINLEVEL_OUTOF10: 3

## 2024-07-16 ASSESSMENT — PAIN DESCRIPTION - LOCATION
LOCATION: ABDOMEN
LOCATION: ABDOMEN

## 2024-07-16 NOTE — ANESTHESIA POSTPROCEDURE EVALUATION
"Patient: Taina Claudio    Procedure Summary       Date: 07/15/24 Room / Location:     Anesthesia Start:  Anesthesia Stop:     Procedure: Labor Analgesia Diagnosis:     Scheduled Providers:  Responsible Provider: CARLTON Khalil    Anesthesia Type: epidural ASA Status: 2            Anesthesia Type: epidural     38w3d 29 y.o.     /85   Pulse 105   Temp 36.5 °C (97.7 °F) (Oral)   Resp 18   Ht 1.651 m (5' 5\")   Wt 85.5 kg (188 lb 7.9 oz)   LMP 10/20/2023   SpO2 98%   Breastfeeding Yes   BMI 31.37 kg/m²         Anesthesia Post Evaluation    Patient location during evaluation: bedside  Patient participation: complete - patient participated  Level of consciousness: awake and alert  Pain score: 0  Pain management: adequate  Airway patency: patent  Cardiovascular status: acceptable  Respiratory status: acceptable  Hydration status: acceptable  Postoperative Nausea and Vomiting: none  Comments: Epidural catheter removed by nursing. No redness, swelling, or drainage at puncture site.    Complete resolution of numbness. Patient is able to lift legs, bend at the knees, and ambulate.    Patient denies problems with urination.    Patient denies nausea, headache or severe back pain.          No notable events documented.    " 72 yo male with PMHx of HTN,  HLD, thyroid CA s/p thyroidectomy, who presented to [facility] with c/o non-radiating SSCP described as crushing/feeling like a knot and of severe intensity lasting 2 minutes before spontaneously resolving while he was standing, prompting him to come to ED for evaluation.  In light of pt's risk factors,  CCS Anginal Class IV Sx, decreased EF, pt transferred to [facility] for cardiac cath with Dr. Jolly to r/o underlying CAD. 72 yo male with PMHx of HTN,  HLD, thyroid CA s/p total thyroidectomy in 2012 (on synthroid), Yepez's esophagus s/p EGD with cauterization in 2014 who presented to [facility] with c/o non-radiating SSCP described as crushing/feeling like a knot and of severe intensity lasting 2 minutes before spontaneously resolving while he was standing, prompting him to come to ED for evaluation.  In light of pt's risk factors,  CCS Anginal Class IV Sx, decreased EF, pt transferred to [facility] for cardiac cath with Dr. Jolly to r/o underlying CAD.

## 2024-07-16 NOTE — CARE PLAN
The patient's goals for the shift include meet discharge criteria    The clinical goals for the shift include Return to pre pregnancy state    Over the shift, the patient made progress toward the following goals.       Problem: Postpartum  Goal: Experiences normal postpartum course  Outcome: Progressing  Flowsheets (Taken 2024)  Experiences normal postpartum course: Monitor maternal vital signs  Goal: Appropriate maternal -  bonding  Outcome: Progressing  Flowsheets (Taken 2024)  Appropriate maternal- bondin-hour rooming in     Problem: Pain - Adult  Goal: Verbalizes/displays adequate comfort level or baseline comfort level  Outcome: Progressing  Flowsheets (Taken 2024)  Verbalizes/displays adequate comfort level or baseline comfort level:   Encourage patient to monitor pain and request assistance   Assess pain using appropriate pain scale     Problem: Safety - Adult  Goal: Free from fall injury  Outcome: Progressing  Flowsheets (Taken 2024)  Free from fall injury:   Instruct family/caregiver on patient safety   Based on caregiver fall risk screen, instruct family/caregiver to ask for assistance with transferring infant if caregiver noted to have fall risk factors     Problem: Discharge Planning  Goal: Discharge to home or other facility with appropriate resources  Outcome: Progressing  Flowsheets (Taken 2024)  Discharge to home or other facility with appropriate resources: Identify barriers to discharge with patient and caregiver

## 2024-07-16 NOTE — PROGRESS NOTES
Patient reports minimal pain s/p epidural placement. Ella OSPINA CRNA at bedside for eval and dosing.    Pitocin cut in half due to contraction frequency and new onset recurrent variables, resolved with position changes    SVE: 10/100/+1  Anticipate  soon

## 2024-07-16 NOTE — LACTATION NOTE
Lactation Consultant Note  Lactation Consultation  Reason for Consult: Initial assessment  Consultant Name: Sheri Sapp RN,IBCLC    Maternal Information  Has mother  before?: Yes  How long did the mother previously breastfeed?: 14yo & 10yo sons at home BF for 2 weeks and 3months  Previous Maternal Breastfeeding Challenges: Lack of support, Other (Comment) (Mother had to return to work at 2 weeks)  Infant to breast within first 2 hours of birth?: Yes  Exclusive Pump and Bottle Feed: No    Maternal Assessment  Breast Assessment: Medium, Symmetrical, Soft, Compressible  Nipple Assessment: Intact, Erect  [place] Assessment: Normal    Infant Assessment  Infant Behavior: Light sleep, Readiness to feed, Feeding cues observed, Rooting response, Sucking  Infant Assessment: Other (Comment) (Mother latching at this time, request LC check latch)    Feeding Assessment  Nutrition Source: Breastmilk  Feeding Method: Nursing at the breast  Feeding Position: Cradle, Mother demonstrates good positioning  Suck/Feeding: Sustained, Baby led rhythmically, Content after feeding, Coordinated suck/swallow/breathe  Latch Assessment: Deep latch obtained, Sucks with long jaw movement, Chin and lower lip contact breast first, Flanged lips, Chin moves in rhythmic motion, Comfortable latch    LATCH TOOL  Latch: Grasps breast, tongue down, lips flanged, rhythmic sucking  Audible Swallowing: Spontaneous and intermittent (24 hours old)  Type of Nipple: Everted (After stimulation)  Comfort (Breast/Nipple): Soft/non-tender  Hold (Positioning): No assist from staff, mother able to position/hold infant  LATCH Score: 10    Breast Pump  Pump: Hand expression (prn LATCHING OR protect milk supply)    Other OB Lactation Tools       Patient Follow-up  Inpatient Lactation Follow-up Needed : Yes  Outpatient Lactation Follow-up: Recommended    Other OB Lactation Documentation  Infant Risk Factors: Early term birth 37-39 weeks, Other (comment)  "(fetal growth restriction antepartum)    Recommendations/Summary  LC at bedside earlier in shift to assess mother latch per her request.  Mother is 32yo   on7/15/24 [id number] of vialble boy \"Trout\" at 39.3 weeks gestation. Mother GBS+, [id number], hx of asthma, positive for celiac disease and other gastrointestinal issues see noted. Fetal Growth restriction with this pregnancy.  Mother  her 14yo and her 10yo 37weeks gestation and 36.5weeks gestation for 2 weeks and 3months. Mother had to return to work at 2 weeks due to FOB moving. Mother states she has 3 different fathers for her sons, however has been with current FOB for 5 years and very supportive and has hattie blended family. She is hoping to breastfeed longer with \"Trout.\"  Mother has great latch and NB sucking effectively with deep latch obtained.   Educated parents on skin to skin, hand expression, hunger cues and feeding frequency/patterns. Discussed expected output, weight loss <10%, and normal bilirubin. Educated on AAP pacifier recommendations. Reviewed outpatient resources.     "

## 2024-07-16 NOTE — PROGRESS NOTES
S: Patient reports contractions have become significantly stronger. Has to stop to breathe through them. Denies rectal pressure.    O: SVE: 5/90/-1      [place]: Sara q2-3 min      FHR: 130, moderate variability, +accelerations, intermittent variable decels noted when it walcher's position. Resolved with position change.    A: 28yo  at 38.3 per LMP c/w 8 week US      Category 2 FHR      FGR --> EFW 5lbs 11oz per  US; WNL dopplers      GBS+      Failed 1-hr, passed 3-hr      Asthma      S/P Tdap and covid series immunizations       rrNIPS --> it's a boy!      H/O abdominal liposuction       Vertex per bedside US     P: Intrauterine resuscitation measures as indicated      CEFM      GBS prophylaxis per guidelines --> adequately treated      Pain relief measures PRN      Encourage maternal positioning for labor/comfort      Pitocin titration per guidelines --> Increased to 30mu @1930      Anticipate

## 2024-07-16 NOTE — ANESTHESIA PROCEDURE NOTES
Epidural Block    Patient location during procedure: OB  Start time: 7/15/2024 9:58 PM  End time: 7/15/2024 10:21 PM  Reason for block: labor analgesia  Staffing  Performed: CRNA   Authorized by: CARLTON Khalil    Performed by: CARLTON Khalil    Preanesthetic Checklist  Completed: patient identified, IV checked, risks and benefits discussed, surgical consent, monitors and equipment checked, pre-op evaluation, timeout performed and sterile techniques followed  Block Timeout  RN/Licensed healthcare professional reads aloud to the Anesthesia provider and entire team: Patient identity, procedure with side and site, patient position, and as applicable the availability of implants/special equipment/special requirements.  Patient on coagulant treatment: no  Timeout performed at: 7/15/2024 10:59 PM  Block Placement  Patient position: sitting  Prep: DuraPrep  Sterility prep: hand, mask, cap, gloves and drape  Sedation level: no sedation  Patient monitoring: blood pressure, continuous pulse oximetry and heart rate  Approach: midline  Local numbing: lidocaine 1% to skin and subcutaneous tissues  Vertebral space: lumbar  Lumbar location: L3-L4  Epidural  Loss of resistance technique: saline  Guidance: landmark technique        Needle  Needle type: Tuohy   Needle gauge: 17  Needle length: 10.2 cm  Needle insertion depth: 6.5 cm  Catheter type: end hole  Catheter size: 19 G  Catheter at skin depth: 10.5 cm  Catheter securement method: clear occlusive dressing    Test dose: lidocaine 1.5% with epinephrine 1-to-200,000  Test dose: lidocaine 1.5% with epinephrine 1-to-200,000  Test dose result: no positive test dose    PCEA  Medication concentration used: 0.2% Ropivacaine with 2 mcg/mL Fentanyl  Dose (mL): 5  Lockout (minutes): 20  1-Hour Limit (boluses/hr): 3  Basal Rate: 8        Assessment  Block outcome: pain improved  Number of attempts: 1  Events: no positive test dose and negative  heme/CSF/paresthesia  Procedure assessment: patient tolerated procedure well with no immediate complications  Additional Notes  Labor pain 10/10 prior to epidural placement.

## 2024-07-16 NOTE — PROGRESS NOTES
Postpartum Progress Note:    Subjective   29 y.o.  on day 1 postpartum  Feels well & happy  Nursing without difficulty  Using po meds for cramping   + Void & flatus and independent self-care    Objective    Last Vitals  Temp Pulse Resp BP MAP O2 Sat   36.6 °C (97.8 °F) 93 17 134/83   100 %     breasts soft, nontender, no s/s  nipple trauma  abdomen non-distended  fundus firm, u/1  Perineum intact  Lochia scant to mod    Assessment/Plan   Taina Claudio is a 29 y.o., , who delivered at 38w3d gestation and is now postpartum day 1.    Continue routine postpartum care  Pain well controlled on PO medications  Patient has been assessed to have a DVT risk score of 3, prophylactic lovenox not indicated  Patient is Rh Positive (Rh+).  Encouraged breastfeeding, lactation consult as needed  Contraception methods discussed, no plans at this time  Bonding well with baby  Normal recovery & involution to date. Appropriate for discharge on PPD #2 if remains stable  Advised patient to follow up in 4-6 weeks with primary OB provider for routine postpartum visit    DARLENE Breaux-CECIL

## 2024-07-16 NOTE — SIGNIFICANT EVENT
RN reported that pt's last b/p was mild range. She was told by the person to took the b/p that the patient was sitting cross-legged in the bed and nursing the baby at the time of the b/p. Pt is asymptomatic. She had a string of mild range pressures when she was sitting for an epidural and then giving birth.    At this time pt meets criteria for GHTN. Will discuss with pt and update MD and CNM on-coming teams.

## 2024-07-16 NOTE — CARE PLAN
Problem: Vaginal Birth or  Section  Goal: Fetal and maternal status remain reassuring during the birth process  2024 by Devorah Bhatia RN  Outcome: Met  7/15/2024 1951 by Devorah Bhatia RN  Outcome: Progressing  Flowsheets (Taken 7/15/2024 1951)  Fetal and maternal status remain reassuring during the birth process:   Monitor vital signs   Monitor fetal heart rate   Monitor uterine activity   Monitor labor progression (Vaginal delivery)  Goal: Prevention of malpresentation/labor dystocia through delivery  2024 by Devorah Bhatia RN  Outcome: Met  7/15/2024 1951 by Devorah Bhatia RN  Outcome: Progressing  Flowsheets (Taken 7/15/2024 1951)  Prevention of malpresentation/labor dystocia through delivery: Positioning, turning, and/or use of birthing ball assistance  Goal: Demonstrates labor coping techniques through delivery  2024 by Devorah Bhatia RN  Outcome: Met  7/15/2024 1951 by Devorah Bhatia RN  Outcome: Progressing  Flowsheets (Taken 7/15/2024 1951)  Demonstrates labor coping techniques through delivery:   Positioning, turning, and/or use of birthing ball assistance   Breathing/relaxation assistance  Goal: Minimal s/sx of HDP and BP<160/110  2024 by Devorah Bhatia RN  Outcome: Met  7/15/2024 1951 by Devorah Bhatia RN  Outcome: Progressing  Flowsheets (Taken 7/15/2024 1951)  Minimal s/sx of HDP and BP <160/110:   Med administration/monitoring of effect   Monitor QBL and vital signs  Goal: No s/sx of infection through recovery  2024 by Devorah Bhatia RN  Outcome: Met  7/15/2024 1951 by Devorah Bhatia RN  Outcome: Progressing  Flowsheets (Taken 7/15/2024 1951)  No s/sx of infection through recovery:   Hygiene practices/suleman-care   Monitor QBL and vital signs  Goal: No s/sx of hemorrhage through recovery  2024 by Devorah Bhatia RN  Outcome: Met  7/15/2024 1951 by Devorah Bhatia RN  Outcome: Progressing  Flowsheets (Taken 7/15/2024  1951)  No s/sx of hemorrhage through recovery: Monitor QBL and vital signs   The patient's goals for the shift include safe labor and delivery of infant    The clinical goals for the shift include Safe labor and delivery of infant

## 2024-07-16 NOTE — L&D DELIVERY NOTE
OB Delivery Note  7/15/2024  Taina Claudio  29 y.o.   Vaginal, Spontaneous       Gestational Age: 38w3d  /Para:   Quantitative Blood Loss: Admission to Discharge: 0 mL (7/15/2024  7:48 AM - 7/15/2024 10:57 PM)    Marco ClaudiomanasaOtoniel [[id number]]      Labor Events    Rupture date/time: 7/15/2024 1443  Rupture type: Artificial  Fluid color: Clear  Fluid odor: None  Labor type: Induced Onset of Labor  Labor allowed to proceed with plans for an attempted vaginal birth?: Yes  Induction: Oxytocin  Induction date/time: 7/15/2024 1018  Induction indications: Other  Complications: None       Labor Event Times    Dilation complete date/time: 7/15/2024 2233  Start pushing date/time: 7/15/2024 2245       Labor Length    2nd stage: 0h 13m  3rd stage: 0h 07m       Placenta    Placenta delivery date/time: 7/15/2024 2253  Placenta removal: Spontaneous  Placenta appearance: Intact  Placenta disposition: lab       Cord    Vessels: 3 vessels  Complications: Nuchal  Nuchal intervention: reduced  Nuchal cord description: loose nuchal cord  Number of loops: 1  Delayed cord clamping?: Yes  Cord clamped date/time: 7/15/2024 22:50:04  Cord blood disposition: Lab  Gases sent?: No  Stem cell collection (by provider): No       Lacerations    Episiotomy: None  Perineal laceration: None  Other lacerations?: No  Repair suture: None       Anesthesia    Method: Epidural       Operative Delivery    Forceps attempted?: No  Vacuum extractor attempted?: No       Shoulder Dystocia    Shoulder dystocia present?: No       [place] Delivery    Birth date/time: 7/15/2024 22:46:00  Delivery type: Vaginal, Spontaneous  Complications: None       Resuscitation    Method: None       Apgars    Living status: Living  Apgar Component Scores:  1 min.:  5 min.:  10 min.:  15 min.:  20 min.:    Skin color:  1  1       Heart rate:  2  2       Reflex irritability:  2  2       Muscle tone:  2  2       Respiratory effort:  2  2       Total:  9  9       Apgars  assigned by: OC,RN       Delivery Providers    Delivering clinician: STARLA Zelaya   Provider Role     Delivery Nurse     Nursery Nurse     Resident    Devorah Bhatia, RN     Nina Montana, STARLA Silva

## 2024-07-17 ENCOUNTER — APPOINTMENT (OUTPATIENT)
Dept: OBSTETRICS AND GYNECOLOGY | Facility: CLINIC | Age: 29
End: 2024-07-17
Payer: COMMERCIAL

## 2024-07-17 VITALS
HEIGHT: 65 IN | DIASTOLIC BLOOD PRESSURE: 90 MMHG | OXYGEN SATURATION: 98 % | WEIGHT: 188.49 LBS | BODY MASS INDEX: 31.4 KG/M2 | HEART RATE: 68 BPM | SYSTOLIC BLOOD PRESSURE: 132 MMHG | RESPIRATION RATE: 17 BRPM | TEMPERATURE: 98.4 F

## 2024-07-17 PROBLEM — Z34.90 ENCOUNTER FOR INDUCTION OF LABOR (HHS-HCC): Status: RESOLVED | Noted: 2024-07-15 | Resolved: 2024-07-17

## 2024-07-17 PROCEDURE — 2500000001 HC RX 250 WO HCPCS SELF ADMINISTERED DRUGS (ALT 637 FOR MEDICARE OP): Performed by: MIDWIFE

## 2024-07-17 ASSESSMENT — PAIN SCALES - GENERAL
PAINLEVEL_OUTOF10: 2
PAINLEVEL_OUTOF10: 0 - NO PAIN
PAINLEVEL_OUTOF10: 0 - NO PAIN

## 2024-07-17 NOTE — PROGRESS NOTES
Postpartum Progress Note    Subjective   Taina Claudio is a 29 y.o., , who delivered at 38w3d gestation and is now postpartum day 2.    Her pain is well controlled with current medications  She is passing flatus  She is ambulating well  She is tolerating a Adult diet Regular  She reports no breast or nursing problems  She denies emotional concerns today       Active Problems:    Gastroesophageal reflux disease    Bilateral carpal tunnel syndrome    Pregnancy Problems (from 21 to present)       Problem Noted Resolved    Prenatal care of multigravida, antepartum ([facility]) 2024 by STARLA Hunt No    Priority:  Medium      Overview Addendum 2024  8:40 AM by STARLA Hunt     -- h/o 2 NSVDs at 36.5 and 37.0 weeks and EAB x 1.   - Covid vaccinated x 3  - Declined flu vaccine   - Tdap given 5/3/24   - rrNIPS, having a boy!   - 1 hour GTT elevated, 3 hour GTT WNL.   - GBS positive, NKDA          Fetal growth restriction antepartum ([facility]) 2024 by STARLA Hunt No    Priority:  Medium      Overview Addendum 2024  2:45 PM by STARLA Hunt     - Noted on 30.6 week US, by AC alone (7%). EFW 19%.  - On US done , now FGR by both AC and EFW (4%). Given this new finding, physician consult visit scheduled.   - Recommendation per MFM- weekly BPP/Dopplers, biometry US at 37 weeks for delivery planning  - Per Dr Singh, formal physician consult visit not needed, given FGR by AC alone.   - History of abdominal liposuction in .          Encounter for induction of labor ([facility]) 7/15/2024 by STARLA Ames 2024 by STARLA Moore    Priority:  Medium            Hospital course: gestational hypertension  Vaginal Birth  Patient is currently breastfeeding  The patient's blood type is B POS.   Objective   Allergies:   Gluten         Last Vitals:  Temp Pulse Resp BP MAP Pulse Ox   36.9 °C (98.4 °F) 68 17 132/90   98 %      Vitals Min/Max Last 24 Hours:  Temp  Min: 36.5 °C (97.7 °F)  Max: 36.9 °C (98.4 °F)  Pulse  Min: 68  Max: 89  Resp  Min: 16  Max: 18  BP  Min: 116/76  Max: 136/93    Intake/Output:   No intake or output data in the 24 hours ending 07/17/24 1006    Physical Exam:  See Discharge summary    Lab Data:  Lab Results   Component Value Date    ABO B 07/15/2024    LABRH POS 07/15/2024    ABSCRN NEG 07/15/2024     Lab Results   Component Value Date    WBC 12.7 (H) 07/15/2024    HGB 12.6 07/15/2024    HCT 37.5 07/15/2024     07/15/2024         Assessment/Plan     Continue routine postpartum care  Pain well controlled on PO medications  Patient has been assessed to have a DVT risk score of 3 , prophylactic lovenox not indicated  Encouraged breastfeeding, lactation consult as needed  Aware of recommendation to stay in hospital 72 hrs after delivery to monitor BPs, pt strongly declines this and would like to be discharged home today. Agrees to monitor BPs at home twice a day and report any abnormal findings. Parameters to call for discussed and written materials provided reflecting this discussion. Pt to follow up in the office or virtually in 3-5 days. Pt verbalizes understanding and agrees to comply with this plan.       Advised patient to follow up in 3-5 days postpartum & again 4-6 weeks with primary OB provider for routine postpartum visit    DARLENE Moore-CECIL

## 2024-07-17 NOTE — DISCHARGE SUMMARY
Discharge Summary    Admission Date: 7/15/2024  Discharge Date: 7/17/24    Discharge Diagnosis  Encounter for induction of labor ([facility])    Hospital Course  Delivery Date: 7/15/2024 10:46 PM  Delivery type: Vaginal, Spontaneous   GA at delivery: 38w3d  Outcome: Living  Anesthesia during delivery: Epidural  Intrapartum complications: None  Feeding method: Breastfeeding Status: Yes     Procedures: none      Pertinent Physical Exam At Time of Discharge    General: Examination reveals a well developed, well nourished, female, in no acute distress. She is alert and cooperative.  HEENT: External ears normal. Nose normal, no erythema or discharge. Mouth and throat clear.  Lungs: normal effort.  Fundus: firm.  Extremities: no redness or tenderness in the calves or thighs, edema 1+.  Psychological: awake and alert; oriented to person, place, and time.    Discharge Meds     Your medication list        ASK your doctor about these medications        Instructions Last Dose Given Next Dose Due   albuterol 90 mcg/actuation inhaler      Inhale 2 puffs every 6 hours if needed for wheezing.       famotidine 20 mg tablet  Commonly known as: Pepcid           fluticasone 220 mcg/actuation inhaler  Commonly known as: Flovent      Inhale 2 puffs 2 times a day. Rinse mouth with water after use to reduce aftertaste and incidence of candidiasis. Do not swallow.       fluticasone 50 mcg/actuation nasal spray  Commonly known as: Flonase      ADMINISTER 2 SPRAYS INTO EACH NOSTRIL ONCE DAILY. SHAKE GENTLY. BEFORE FIRST USE, PRIME PUMP. AFTER USE, CLEAN TIP AND REPLACE CAP.       PRENATA ORAL                     Complications Requiring Follow-Up  Gestational hypertension    Test Results Pending At Discharge  Pending Labs       Order Current Status    Surgical Pathology Exam - PLACENTA In process            Outpatient Follow-Up  Future Appointments   Date Time Provider Department Center   7/17/2024  2:00 PM STARLA Hunt [id number]  [place]   7/19/2024 12:15 PM MAC [id number] OBGYN[facility] ULTRASOUND 1 [id number] [facility] [place]    7/24/2024  2:20 PM STARLA Hunt [id number] [place]   7/26/2024 12:15 PM MAC [id number] OBGYN[facility] ULTRASOUND 1 [id number] R[facility] [place]    8/28/2024 10:00 AM STARLA Hunt [id number] [place]       STARLA Moore

## 2024-07-17 NOTE — CARE PLAN
Problem: Postpartum  Goal: Experiences normal postpartum course  Outcome: Progressing  Flowsheets (Taken 2024)  Experiences normal postpartum course:   Monitor maternal vital signs   Med administration/monitoring of effect   Assist with identification of coping methods and supprot resources   Assess uterine involution   Fundal and lochia asssessments w/fundal massage, bladder emptying  Goal: Appropriate maternal -  bonding  Outcome: Progressing  Flowsheets (Taken 2024)  Appropriate maternal- bonding:   Identify family support   24-hour rooming in     Problem: Pain - Adult  Goal: Verbalizes/displays adequate comfort level or baseline comfort level  Outcome: Progressing  Flowsheets (Taken 2024)  Verbalizes/displays adequate comfort level or baseline comfort level:   Encourage patient to monitor pain and request assistance   Assess pain using appropriate pain scale   Administer analgesics based on type and severity of pain and evaluate response   Implement non-pharmacological measures as appropriate and evaluate response     Problem: Safety - Adult  Goal: Free from fall injury  Outcome: Progressing  Flowsheets (Taken 2024)  Free from fall injury: Instruct family/caregiver on patient safety     Problem: Discharge Planning  Goal: Discharge to home or other facility with appropriate resources  Outcome: Progressing  Flowsheets (Taken 2024)  Discharge to home or other facility with appropriate resources:   Identify barriers to discharge with patient and caregiver   Arrange for needed discharge resources and transportation as appropriate   Identify discharge learning needs (meds, wound care, etc)   The patient's goals for the shift include maintain normal range bp    The clinical goals for the shift include Return to pre-pregnancy state

## 2024-07-17 NOTE — DISCHARGE INSTRUCTIONS
Any woman can have complications after a birth including a blood clot, a heart problem, hypertensive disorder/eclampsia, depression, hemorrhage, or infection. Notify all providers of your delivery date up to one year after birth.*       Call 911 or go to nearest emergency room right away if you have:   PAIN or pressure in chest;   OBSTRUCTED breathing or shortness of breath;   SEIZURES;   THOUGHTS of hurting yourself or someone else; heart palpitations/racing; change in alertness/confusion.    Call your provider if you have:   BLEEDING, soaking through a pad/hour, or blood clots the size of an egg or bigger;   INCISION (episiotomy stitches or  site) that is not healing (increased redness, pain, drainage/pus, or separation) if you had one;   RED or swollen leg/calf that is painful or warm to touch, especially in one leg more than the other;   TEMPERATURE of 100.4 F or higher or chills;   HEADACHE that does not get better with medicine, rest or hydration, or bad headache with vision changes   like spots or flashing lights; increased swelling of face, hands or legs; severe cramps or upper right belly pain; red or swollen breast that is painful or warm to touch; an unusual, foul odor from your vaginal discharge; pain, burning, or difficulty during urination; severe constipation (more than 5 days); feelings of depression (such as depressed mood, loss of interest in enjoyable things, unable to care for yourself, trouble sleeping, lack of appetite, or feeling worthless).     If you can't reach your provider or symptoms worsen, call 911 or go to nearest emergency room.   *Information obtained from [employer]: Save Your Life: Get Care for These POST-BIRTH Warning Signs

## 2024-07-19 ENCOUNTER — APPOINTMENT (OUTPATIENT)
Dept: RADIOLOGY | Facility: CLINIC | Age: 29
End: 2024-07-19
Payer: COMMERCIAL

## 2024-07-19 LAB
LABORATORY COMMENT REPORT: NORMAL
PATH REPORT.FINAL DX SPEC: NORMAL
PATH REPORT.GROSS SPEC: NORMAL
PATH REPORT.RELEVANT HX SPEC: NORMAL
PATH REPORT.TOTAL CANCER: NORMAL

## 2024-07-22 ENCOUNTER — APPOINTMENT (OUTPATIENT)
Dept: OBSTETRICS AND GYNECOLOGY | Facility: CLINIC | Age: 29
End: 2024-07-22
Payer: COMMERCIAL

## 2024-07-22 ENCOUNTER — HOSPITAL ENCOUNTER (OUTPATIENT)
Facility: HOSPITAL | Age: 29
Setting detail: OBSERVATION
Discharge: HOME | End: 2024-07-23
Attending: OBSTETRICS & GYNECOLOGY
Payer: COMMERCIAL

## 2024-07-22 VITALS
BODY MASS INDEX: 28.51 KG/M2 | HEART RATE: 84 BPM | SYSTOLIC BLOOD PRESSURE: 149 MMHG | OXYGEN SATURATION: 98 % | HEIGHT: 65 IN | WEIGHT: 171.13 LBS | DIASTOLIC BLOOD PRESSURE: 110 MMHG

## 2024-07-22 DIAGNOSIS — F41.9 ANXIETY AND DEPRESSION: ICD-10-CM

## 2024-07-22 DIAGNOSIS — O16.9: Primary | ICD-10-CM

## 2024-07-22 DIAGNOSIS — F32.A ANXIETY AND DEPRESSION: ICD-10-CM

## 2024-07-22 PROBLEM — O36.5990 FETAL GROWTH RESTRICTION ANTEPARTUM (HHS-HCC): Status: RESOLVED | Noted: 2024-05-28 | Resolved: 2024-07-22

## 2024-07-22 PROBLEM — Z34.80 PRENATAL CARE OF MULTIGRAVIDA, ANTEPARTUM (HHS-HCC): Status: RESOLVED | Noted: 2024-05-28 | Resolved: 2024-07-22

## 2024-07-22 PROBLEM — O13.9 GESTATIONAL HYPERTENSION, ANTEPARTUM (HHS-HCC): Status: ACTIVE | Noted: 2024-07-22

## 2024-07-22 LAB
ALBUMIN SERPL BCP-MCNC: 3.8 G/DL (ref 3.4–5)
ALP SERPL-CCNC: 116 U/L (ref 33–110)
ALT SERPL W P-5'-P-CCNC: 31 U/L (ref 7–45)
ANION GAP SERPL CALC-SCNC: 13 MMOL/L (ref 10–20)
AST SERPL W P-5'-P-CCNC: 29 U/L (ref 9–39)
BILIRUB SERPL-MCNC: 0.5 MG/DL (ref 0–1.2)
BUN SERPL-MCNC: 10 MG/DL (ref 6–23)
CALCIUM SERPL-MCNC: 9.6 MG/DL (ref 8.6–10.3)
CHLORIDE SERPL-SCNC: 106 MMOL/L (ref 98–107)
CO2 SERPL-SCNC: 24 MMOL/L (ref 21–32)
CREAT SERPL-MCNC: 0.69 MG/DL (ref 0.5–1.05)
EGFRCR SERPLBLD CKD-EPI 2021: >90 ML/MIN/1.73M*2
ERYTHROCYTE [DISTWIDTH] IN BLOOD BY AUTOMATED COUNT: 12.9 % (ref 11.5–14.5)
GLUCOSE SERPL-MCNC: 63 MG/DL (ref 74–99)
HCT VFR BLD AUTO: 45.6 % (ref 36–46)
HGB BLD-MCNC: 15.3 G/DL (ref 12–16)
LDH SERPL L TO P-CCNC: 340 U/L (ref 84–246)
MCH RBC QN AUTO: 29.3 PG (ref 26–34)
MCHC RBC AUTO-ENTMCNC: 33.6 G/DL (ref 32–36)
MCV RBC AUTO: 87 FL (ref 80–100)
NRBC BLD-RTO: 0 /100 WBCS (ref 0–0)
PLATELET # BLD AUTO: 372 X10*3/UL (ref 150–450)
POTASSIUM SERPL-SCNC: 3.8 MMOL/L (ref 3.5–5.3)
PROT SERPL-MCNC: 7.7 G/DL (ref 6.4–8.2)
RBC # BLD AUTO: 5.23 X10*6/UL (ref 4–5.2)
SODIUM SERPL-SCNC: 139 MMOL/L (ref 136–145)
URATE SERPL-MCNC: 8.4 MG/DL (ref 2.3–6.7)
WBC # BLD AUTO: 13.7 X10*3/UL (ref 4.4–11.3)

## 2024-07-22 PROCEDURE — 99213 OFFICE O/P EST LOW 20 MIN: CPT | Performed by: ADVANCED PRACTICE MIDWIFE

## 2024-07-22 PROCEDURE — 84550 ASSAY OF BLOOD/URIC ACID: CPT

## 2024-07-22 PROCEDURE — 36415 COLL VENOUS BLD VENIPUNCTURE: CPT

## 2024-07-22 PROCEDURE — 2500000004 HC RX 250 GENERAL PHARMACY W/ HCPCS (ALT 636 FOR OP/ED)

## 2024-07-22 PROCEDURE — 96375 TX/PRO/DX INJ NEW DRUG ADDON: CPT

## 2024-07-22 PROCEDURE — 83615 LACTATE (LD) (LDH) ENZYME: CPT

## 2024-07-22 PROCEDURE — 96374 THER/PROPH/DIAG INJ IV PUSH: CPT

## 2024-07-22 PROCEDURE — 99222 1ST HOSP IP/OBS MODERATE 55: CPT

## 2024-07-22 PROCEDURE — 2500000001 HC RX 250 WO HCPCS SELF ADMINISTERED DRUGS (ALT 637 FOR MEDICARE OP)

## 2024-07-22 PROCEDURE — 2500000002 HC RX 250 W HCPCS SELF ADMINISTERED DRUGS (ALT 637 FOR MEDICARE OP, ALT 636 FOR OP/ED)

## 2024-07-22 PROCEDURE — 80053 COMPREHEN METABOLIC PANEL: CPT

## 2024-07-22 PROCEDURE — 2500000002 HC RX 250 W HCPCS SELF ADMINISTERED DRUGS (ALT 637 FOR MEDICARE OP, ALT 636 FOR OP/ED): Performed by: OBSTETRICS & GYNECOLOGY

## 2024-07-22 PROCEDURE — 85027 COMPLETE CBC AUTOMATED: CPT

## 2024-07-22 PROCEDURE — G0378 HOSPITAL OBSERVATION PER HR: HCPCS

## 2024-07-22 RX ORDER — SERTRALINE HYDROCHLORIDE 50 MG/1
50 TABLET, FILM COATED ORAL DAILY
Qty: 90 TABLET | Refills: 1 | Status: SHIPPED | OUTPATIENT
Start: 2024-07-22 | End: 2025-01-18

## 2024-07-22 RX ORDER — METOCLOPRAMIDE HYDROCHLORIDE 5 MG/ML
10 INJECTION INTRAMUSCULAR; INTRAVENOUS EVERY 8 HOURS PRN
Status: DISCONTINUED | OUTPATIENT
Start: 2024-07-22 | End: 2024-07-23 | Stop reason: HOSPADM

## 2024-07-22 RX ORDER — SERTRALINE HYDROCHLORIDE 50 MG/1
50 TABLET, FILM COATED ORAL DAILY
Status: DISCONTINUED | OUTPATIENT
Start: 2024-07-22 | End: 2024-07-23 | Stop reason: HOSPADM

## 2024-07-22 RX ORDER — FAMOTIDINE 20 MG/1
20 TABLET, FILM COATED ORAL EVERY 12 HOURS PRN
Status: DISCONTINUED | OUTPATIENT
Start: 2024-07-22 | End: 2024-07-23 | Stop reason: HOSPADM

## 2024-07-22 RX ORDER — DIPHENHYDRAMINE HCL 25 MG
25 TABLET ORAL EVERY 6 HOURS PRN
Status: DISCONTINUED | OUTPATIENT
Start: 2024-07-22 | End: 2024-07-23 | Stop reason: HOSPADM

## 2024-07-22 RX ORDER — NIFEDIPINE 30 MG/1
30 TABLET, FILM COATED, EXTENDED RELEASE ORAL ONCE
Status: COMPLETED | OUTPATIENT
Start: 2024-07-22 | End: 2024-07-22

## 2024-07-22 RX ORDER — ONDANSETRON HYDROCHLORIDE 2 MG/ML
4 INJECTION, SOLUTION INTRAVENOUS EVERY 6 HOURS PRN
Status: DISCONTINUED | OUTPATIENT
Start: 2024-07-22 | End: 2024-07-23 | Stop reason: HOSPADM

## 2024-07-22 RX ORDER — LABETALOL HYDROCHLORIDE 5 MG/ML
20 INJECTION, SOLUTION INTRAVENOUS ONCE AS NEEDED
Status: DISCONTINUED | OUTPATIENT
Start: 2024-07-22 | End: 2024-07-22

## 2024-07-22 RX ORDER — HYDRALAZINE HYDROCHLORIDE 20 MG/ML
5 INJECTION INTRAMUSCULAR; INTRAVENOUS ONCE AS NEEDED
Status: DISCONTINUED | OUTPATIENT
Start: 2024-07-22 | End: 2024-07-23 | Stop reason: HOSPADM

## 2024-07-22 RX ORDER — NIFEDIPINE 10 MG/1
CAPSULE ORAL
Status: COMPLETED
Start: 2024-07-22 | End: 2024-07-22

## 2024-07-22 RX ORDER — ONDANSETRON 4 MG/1
4 TABLET, FILM COATED ORAL EVERY 6 HOURS PRN
Status: DISCONTINUED | OUTPATIENT
Start: 2024-07-22 | End: 2024-07-23 | Stop reason: HOSPADM

## 2024-07-22 RX ORDER — NIFEDIPINE 10 MG/1
10 CAPSULE ORAL ONCE AS NEEDED
Status: COMPLETED | OUTPATIENT
Start: 2024-07-22 | End: 2024-07-22

## 2024-07-22 RX ORDER — KETOROLAC TROMETHAMINE 30 MG/ML
30 INJECTION, SOLUTION INTRAMUSCULAR; INTRAVENOUS ONCE
Status: COMPLETED | OUTPATIENT
Start: 2024-07-22 | End: 2024-07-22

## 2024-07-22 RX ORDER — NIFEDIPINE 60 MG/1
60 TABLET, FILM COATED, EXTENDED RELEASE ORAL
Status: DISCONTINUED | OUTPATIENT
Start: 2024-07-23 | End: 2024-07-23 | Stop reason: HOSPADM

## 2024-07-22 RX ORDER — LIDOCAINE HYDROCHLORIDE 10 MG/ML
0.5 INJECTION, SOLUTION EPIDURAL; INFILTRATION; INTRACAUDAL; PERINEURAL ONCE AS NEEDED
Status: DISCONTINUED | OUTPATIENT
Start: 2024-07-22 | End: 2024-07-23 | Stop reason: HOSPADM

## 2024-07-22 RX ORDER — ALBUTEROL SULFATE 90 UG/1
2 AEROSOL, METERED RESPIRATORY (INHALATION) EVERY 6 HOURS PRN
Status: DISCONTINUED | OUTPATIENT
Start: 2024-07-22 | End: 2024-07-23 | Stop reason: HOSPADM

## 2024-07-22 RX ORDER — ACETAMINOPHEN 325 MG/1
975 TABLET ORAL EVERY 6 HOURS PRN
Status: DISCONTINUED | OUTPATIENT
Start: 2024-07-22 | End: 2024-07-23 | Stop reason: HOSPADM

## 2024-07-22 RX ORDER — NIFEDIPINE 30 MG/1
30 TABLET, FILM COATED, EXTENDED RELEASE ORAL
Status: DISCONTINUED | OUTPATIENT
Start: 2024-07-22 | End: 2024-07-22

## 2024-07-22 SDOH — HEALTH STABILITY: MENTAL HEALTH: WISH TO BE DEAD (PAST 1 MONTH): NO

## 2024-07-22 SDOH — HEALTH STABILITY: MENTAL HEALTH: SUICIDAL BEHAVIOR (LIFETIME): NO

## 2024-07-22 SDOH — SOCIAL STABILITY: SOCIAL INSECURITY: DO YOU FEEL ANYONE HAS EXPLOITED OR TAKEN ADVANTAGE OF YOU FINANCIALLY OR OF YOUR PERSONAL PROPERTY?: NO

## 2024-07-22 SDOH — SOCIAL STABILITY: SOCIAL INSECURITY: HAVE YOU HAD ANY THOUGHTS OF HARMING ANYONE ELSE?: NO

## 2024-07-22 SDOH — SOCIAL STABILITY: SOCIAL INSECURITY: HAVE YOU HAD THOUGHTS OF HARMING ANYONE ELSE?: NO

## 2024-07-22 SDOH — SOCIAL STABILITY: SOCIAL INSECURITY: PHYSICAL ABUSE: DENIES

## 2024-07-22 SDOH — HEALTH STABILITY: MENTAL HEALTH: WERE YOU ABLE TO COMPLETE ALL THE BEHAVIORAL HEALTH SCREENINGS?: YES

## 2024-07-22 SDOH — SOCIAL STABILITY: SOCIAL INSECURITY: DOES ANYONE TRY TO KEEP YOU FROM HAVING/CONTACTING OTHER FRIENDS OR DOING THINGS OUTSIDE YOUR HOME?: NO

## 2024-07-22 SDOH — SOCIAL STABILITY: SOCIAL INSECURITY: HAS ANYONE EVER THREATENED TO HURT YOUR FAMILY OR YOUR PETS?: NO

## 2024-07-22 SDOH — SOCIAL STABILITY: SOCIAL INSECURITY: ARE YOU OR HAVE YOU BEEN THREATENED OR ABUSED PHYSICALLY, EMOTIONALLY, OR SEXUALLY BY ANYONE?: NO

## 2024-07-22 SDOH — SOCIAL STABILITY: SOCIAL INSECURITY: ARE THERE ANY APPARENT SIGNS OF INJURIES/BEHAVIORS THAT COULD BE RELATED TO ABUSE/NEGLECT?: NO

## 2024-07-22 SDOH — ECONOMIC STABILITY: HOUSING INSECURITY: DO YOU FEEL UNSAFE GOING BACK TO THE PLACE WHERE YOU ARE LIVING?: NO

## 2024-07-22 SDOH — SOCIAL STABILITY: SOCIAL INSECURITY: VERBAL ABUSE: DENIES

## 2024-07-22 SDOH — SOCIAL STABILITY: SOCIAL INSECURITY: ABUSE SCREEN: ADULT

## 2024-07-22 SDOH — HEALTH STABILITY: MENTAL HEALTH: NON-SPECIFIC ACTIVE SUICIDAL THOUGHTS (PAST 1 MONTH): NO

## 2024-07-22 ASSESSMENT — PATIENT HEALTH QUESTIONNAIRE - PHQ9
2. FEELING DOWN, DEPRESSED OR HOPELESS: NOT AT ALL
SUM OF ALL RESPONSES TO PHQ9 QUESTIONS 1 & 2: 0
1. LITTLE INTEREST OR PLEASURE IN DOING THINGS: NOT AT ALL

## 2024-07-22 ASSESSMENT — PAIN SCALES - GENERAL
PAINLEVEL_OUTOF10: 3
PAINLEVEL_OUTOF10: 0 - NO PAIN
PAINLEVEL_OUTOF10: 3
PAINLEVEL_OUTOF10: 0 - NO PAIN
PAINLEVEL_OUTOF10: 3

## 2024-07-22 ASSESSMENT — ACTIVITIES OF DAILY LIVING (ADL): LACK_OF_TRANSPORTATION: NO

## 2024-07-22 ASSESSMENT — EDINBURGH POSTNATAL DEPRESSION SCALE (EPDS)
I HAVE BEEN ANXIOUS OR WORRIED FOR NO GOOD REASON: YES, VERY OFTEN
I HAVE FELT SAD OR MISERABLE: NOT VERY OFTEN
THINGS HAVE BEEN GETTING ON TOP OF ME: YES, SOMETIMES I HAVEN'T BEEN COPING AS WELL AS USUAL
I HAVE FELT SCARED OR PANICKY FOR NO GOOD REASON: YES, SOMETIMES
TOTAL SCORE: 11
THE THOUGHT OF HARMING MYSELF HAS OCCURRED TO ME: NEVER
I HAVE BEEN SO UNHAPPY THAT I HAVE HAD DIFFICULTY SLEEPING: NOT VERY OFTEN
I HAVE BEEN ABLE TO LAUGH AND SEE THE FUNNY SIDE OF THINGS: AS MUCH AS I ALWAYS COULD
I HAVE BEEN SO UNHAPPY THAT I HAVE BEEN CRYING: ONLY OCCASIONALLY
I HAVE BLAMED MYSELF UNNECESSARILY WHEN THINGS WENT WRONG: NOT VERY OFTEN
I HAVE LOOKED FORWARD WITH ENJOYMENT TO THINGS: AS MUCH AS I EVER DID

## 2024-07-22 ASSESSMENT — LIFESTYLE VARIABLES
HOW OFTEN DO YOU HAVE 6 OR MORE DRINKS ON ONE OCCASION: NEVER
AUDIT-C TOTAL SCORE: 0
SKIP TO QUESTIONS 9-10: 1
HOW OFTEN DO YOU HAVE A DRINK CONTAINING ALCOHOL: NEVER
HOW MANY STANDARD DRINKS CONTAINING ALCOHOL DO YOU HAVE ON A TYPICAL DAY: PATIENT DOES NOT DRINK
AUDIT-C TOTAL SCORE: 0

## 2024-07-22 ASSESSMENT — PAIN DESCRIPTION - DESCRIPTORS
DESCRIPTORS: ACHING
DESCRIPTORS: ACHING

## 2024-07-22 NOTE — PROGRESS NOTES
"Taina Claudio is a 29 y.o. female on day 0 of admission presenting with Hypertension in delivered pregnancy ([facility]).    Subjective   Patient reports continued HA despite being medicated with Tylenol.        Objective     Physical Exam    Last Recorded Vitals  Blood pressure (!) 157/109, pulse 72, temperature 36.6 °C (97.9 °F), resp. rate 18, height 1.651 m (5' 5\"), weight 77.1 kg (170 lb), last menstrual period 10/20/2023, SpO2 98%, currently breastfeeding.  Intake/Output last 3 Shifts:      Relevant Results     Results for orders placed or performed during the hospital encounter of 07/22/24 (from the past 24 hour(s))   Comprehensive metabolic panel   Result Value Ref Range    Glucose 63 (L) 74 - 99 mg/dL    Sodium 139 136 - 145 mmol/L    Potassium 3.8 3.5 - 5.3 mmol/L    Chloride 106 98 - 107 mmol/L    Bicarbonate 24 21 - 32 mmol/L    Anion Gap 13 10 - 20 mmol/L    Urea Nitrogen 10 6 - 23 mg/dL    Creatinine 0.69 0.50 - 1.05 mg/dL    eGFR >90 >60 mL/min/1.73m*2    Calcium 9.6 8.6 - 10.3 mg/dL    Albumin 3.8 3.4 - 5.0 g/dL    Alkaline Phosphatase 116 (H) 33 - 110 U/L    Total Protein 7.7 6.4 - 8.2 g/dL    AST 29 9 - 39 U/L    Bilirubin, Total 0.5 0.0 - 1.2 mg/dL    ALT 31 7 - 45 U/L   Uric Acid   Result Value Ref Range    Uric Acid 8.4 (H) 2.3 - 6.7 mg/dL   Lactate Dehydrogenase   Result Value Ref Range     (H) 84 - 246 U/L   CBC   Result Value Ref Range    WBC 13.7 (H) 4.4 - 11.3 x10*3/uL    nRBC 0.0 0.0 - 0.0 /100 WBCs    RBC 5.23 (H) 4.00 - 5.20 x10*6/uL    Hemoglobin 15.3 12.0 - 16.0 g/dL    Hematocrit 45.6 36.0 - 46.0 %    MCV 87 80 - 100 fL    MCH 29.3 26.0 - 34.0 pg    MCHC 33.6 32.0 - 36.0 g/dL    RDW 12.9 11.5 - 14.5 %    Platelets 372 150 - 450 x10*3/uL                      Assessment/Plan   Principal Problem:    Hypertension in delivered pregnancy ([facility])  Active Problems:    Gestational hypertension, antepartum ([facility])    Patient to receive Reglan, Benadryl, and Ketorolac for HA. Discussed " POC    With Dr. Rubio who is in agreement.       DARLENE Pereyra-CECIL

## 2024-07-22 NOTE — HOSPITAL COURSE
Hx:  NSVB 7/15/24, intact  Readmit for sever range blood pressure evaluations    10 mg IR procardia and 30 mg ER procardia initiated    LDH elevated, though could be related to hemolysis  Uric acid 8.4  elevated

## 2024-07-22 NOTE — H&P
Obstetrical Admission History and Physical    Chief Complaint: Hypertension (Severe BP's in office.)    Subjective    Taina Claudio is a 29 y.o.  at one week postpartum presenting for elevated blood pressures in the office. Patient had an uncomplicated NSVB on 7/15/24. Patient dx with gHTN during that stay. Patient had been monitoring Bps at home and had one sever range elevation yesterday and when being seen in the office for one week BP evaluation severe range BP noted.     Bp on arrival initially mild range then severe range and persistent. Patient reports mild HA over the last two days that comes and goes and is located at the base of her skull. Patient denies taking any medication for HA as she states frequent HA's are common for her. Patient denies any increase in swelling, vision changes, SOB, chest pain, or RUQ pain.       Obstetrical History   # 1 - Date: None, Sex: None, Weight: None, GA: 5w0d, Type: First Trimester Surgical Treatment, Apgar1: None, Apgar5: None, Living: None, Birth Comments: None    # 2 - Date: 11, Sex: Male, Weight: 2.75 kg, GA: 37w0d, Type: None, Apgar1: None, Apgar5: None, Living: Living, Birth Comments: None    # 3 - Date: 13, Sex: Male, Weight: 2.75 kg, GA: 36w5d, Type: Vaginal, Spontaneous, Apgar1: None, Apgar5: None, Living: Living, Birth Comments: None    # 4 - Date: 07/15/24, Sex: Male, Weight: 2.96 kg, GA: 38w3d, Type: Vaginal, Spontaneous, Apgar1: 9, Apgar5: 9, Living: Living, Birth Comments: None    # 5 - Date: None, Sex: None, Weight: None, GA: None, Type: None, Apgar1: None, Apgar5: None, Living: None, Birth Comments: None    Gynecological History      Past Medical History  The patient has a past medical history of Depression, unspecified (2016). Mild persistent asthma    Past Surgical History   The patient has a past surgical history that includes Liposuction.     Social History  The patient reports that she has never smoked. She has never used  "smokeless tobacco. She reports that she does not currently use alcohol. She reports that she does not use drugs.     Family History  The patient's family history includes Diabetes type II in an other family member; Hypertension in her father, mother, and another family member; Kidney failure in an other family member; systemic lupus erythematosus in an other family member. Mother and sister with chronic hypertension.     Genetic History  Genetic Screening    No values documented         Allergies  Gluten     Medications  Medications Prior to Admission   Medication Sig Dispense Refill Last Dose    albuterol 90 mcg/actuation inhaler Inhale 2 puffs every 6 hours if needed for wheezing. 18 g 1     famotidine (Pepcid) 20 mg tablet Take 1 tablet (20 mg) by mouth every 12 hours if needed.       fluticasone (Flovent) 220 mcg/actuation inhaler Inhale 2 puffs 2 times a day. Rinse mouth with water after use to reduce aftertaste and incidence of candidiasis. Do not swallow. 12 g 5     prenatal vit37/iron/folic acid (PRENATA ORAL) Take by mouth.       sertraline (Zoloft) 50 mg tablet Take 1 tablet (50 mg) by mouth once daily. 90 tablet 1         Review of Systems:  Pertinent items are noted in HPI.    Objective    Physical Examination  Vital Signs:  BP (!) 152/105   Pulse 78   Temp 36.6 °C (97.9 °F)   Resp 18   Ht 1.651 m (5' 5\")   Wt 77.1 kg (170 lb)   BMI 28.29 kg/m²    GENERAL: Examination reveals a well developed, well nourished,  female in no acute distress. She is alert and cooperative.  HEENT: conjunctiva clear.   LUNGS: clear to auscultation bilaterally.  HEART: regular rate and rhythm, S1, S2 normal, no murmur, click, rub or gallop  ABDOMEN: soft, nontender, nondistended, no abnormal masses, no epigastric pain  Uterus: Firm down 3. Lochia scant  EXTREMITIES: no redness or tenderness in the calves or thighs, no edema. Good muscle tone with no atrophy.  SKIN: normal coloration and turgor, no rashes.  NEUROLOGICAL: " reflexes are DTRs normal and symmetrical. Normal sensation in all extremities.  PSYCHOLOGICAL: mood normal    Fetal Assessment  Mode:  (n/a, post partum)      Lab Review:  Results for orders placed or performed during the hospital encounter of 07/15/24   Type And Screen   Result Value Ref Range    ABO TYPE B     Rh TYPE POS     ANTIBODY SCREEN NEG    CBC   Result Value Ref Range    WBC 12.7 (H) 4.4 - 11.3 x10*3/uL    nRBC 0.0 0.0 - 0.0 /100 WBCs    RBC 4.31 4.00 - 5.20 x10*6/uL    Hemoglobin 12.6 12.0 - 16.0 g/dL    Hematocrit 37.5 36.0 - 46.0 %    MCV 87 80 - 100 fL    MCH 29.2 26.0 - 34.0 pg    MCHC 33.6 32.0 - 36.0 g/dL    RDW 13.6 11.5 - 14.5 %    Platelets 257 150 - 450 x10*3/uL   Syphilis Screen with Reflex   Result Value Ref Range    Syphilis Total Ab Nonreactive Nonreactive   Surgical Pathology Exam - PLACENTA   Result Value Ref Range    Case Report       Surgical Pathology                                Case: [id number]                                  Authorizing Provider:  Angelica Mendez MD   Collected:           07/15/2024 2322              Ordering Location:     [facility]       Received:            07/16/2024 [address] [place] [address] Obstetrics                                                                           and Gynecology                                                               Pathologist:           Kevin Barreto MD                                                         Specimen:    PLACENTA SINGLE                                                                            FINAL DIAGNOSIS       Placenta:   Mature placenta, 418 g.  Amniotic web at umbilical insertion site    Marginal fibrin plaque  Remote intervillous hematoma              By the signature on this report, the individual or group listed as making the Final Interpretation/Diagnosis certifies that they have reviewed this case.       Clinical History       IUGR      Gross  "Description       A: Received fresh, labeled with the patient´s name and hospital number and \"placenta single\", without an accompanying placental record sheet, is a placenta. The placental membranes are translucent, and complete. The membrane insertion is circummarginate, involving 75% of the circumference of the disc. The point of membrane rupture is 10.5 cm from the nearest margin. The umbilical cord is pale-yellow, 67.0 cm in total length, and inserts in the placenta, 7 cm from the margin. There are up to 2 coils per 10 cm. An amniotic web is attached, 3.2 cm from the insertion site. On cut section, the cord has 3 vessels, and measures 2.0 x 1.0 cm. The placenta is discoid, 19.0 x 15.0 x 2.8 cm, and weighs 417.9 g without cord or membranes. The fetal surface is blue-red, and translucent. The maternal surface is complete. The cut surface is dark-red,and spongy. Two possible tan-white firm lesions, are identified, measuring 2.5 x 1.5 x 0.9 cm and 3.5 x 1.0 x 0.7 cm and located 0.1 cm and and 6.0 cm from the margin. Representative sections are submitted in 6 cassettes.  DMB    Summary of Cassettes:  Specimen Label  Site  A  1  umbilical cord sections     2  membrane rolls     3  placental parenchyma, umbilical cord insertion site    4  placental parenchyma     5-6  placental parenchyma and representative section of possible tan-white firm lesion        Mom Prenatal Results      Prenatal Results    The patient does not have a working REMEDIOS. Some results will not display without a working REMEDIOS.  1st Trimester       Test Value Reference Range Date Time    CBC w/ Reflex  (See Report)    05/03/24 1258       (See Report)    12/20/23 0925    HGB  12.6 g/dL 12.0 - 16.0 07/15/24 0840       13.4 g/dL 12.0 - 16.0 05/03/24 1258       13.7 g/dL 12.0 - 16.0 12/20/23 0925    HCT  37.5 % 36.0 - 46.0 07/15/24 0840       39.7 % 36.0 - 46.0 05/03/24 1258       39.3 % 36.0 - 46.0 12/20/23 0925    Platelet Count  257 x10*3/uL 150 - 450 " 07/15/24 0840       264 x10*3/uL 150 - 450 05/03/24 1258       233 x10*3/uL 150 - 450 12/20/23 0925    MCV  87 fL 80 - 100 07/15/24 0840       90 fL 80 - 100 05/03/24 1258       88 fL 80 - 100 12/20/23 0925    Blood type (ABO)  B   07/15/24 0840    Rh Type  POS   07/15/24 0840    Antibody Screen        Hemoglobin Identification        Manual Hemoglobin Identification        Chlamydia and Gonorrhea Screening  (See Report)    12/20/23 0935    Chlamydia Screen  Negative  Negative 12/20/23 0935    Gonorrhea Screen  Negative  Negative 12/20/23 0935    Syphilis Screening w/ Reflex  Nonreactive  Nonreactive 07/15/24 1020       Nonreactive  Nonreactive 12/20/23 0925    Rubella  Positive  Negative 12/20/23 0925    Hep C  Nonreactive  Nonreactive 12/20/23 0925    Urine Culture  No growth   12/20/23 0935    Map Positive Urine GBS for Storyboard        P/C Ratio        HBsAg  Nonreactive  Nonreactive 12/20/23 0925    HIV-1 and HIV-2 Antibodies  Nonreactive  Nonreactive 12/20/23 0925    TSH with Reflex         T4 Free        Varicella        Pap Smear  This result contains rich text formatting which cannot be displayed here.   02/14/24 1550    HbA1c        1 Hour Glucola  139 mg/dL <135 05/03/24 1258    Fasting Glucose Tolerance 3 hour  62 mg/dL <95 05/07/24 0950    GTT, 1 hour  122 mg/dL <180 05/07/24 1051    GTT, 2 hour  100 mg/dL <155 05/07/24 1157    GTT, 3 hour  91 mg/dL <140 05/07/24 1255    cfDNA  See Scanned Result   01/25/24 0954          2nd Trimester       Test Value Reference Range Date Time    CBC w/ Reflex  (See Report)    05/03/24 1258       (See Report)    12/20/23 0925    HGB  12.6 g/dL 12.0 - 16.0 07/15/24 0840       13.4 g/dL 12.0 - 16.0 05/03/24 1258       13.7 g/dL 12.0 - 16.0 12/20/23 0925    HCT  37.5 % 36.0 - 46.0 07/15/24 0840       39.7 % 36.0 - 46.0 05/03/24 1258       39.3 % 36.0 - 46.0 12/20/23 0925    Platelet Count  257 x10*3/uL 150 - 450 07/15/24 0840       264 x10*3/uL 150 - 450 05/03/24 1258        233 x10*3/uL 150 - 450 23 0925    MCV  87 fL 80 - 100 07/15/24 0840       90 fL 80 - 100 24 1258       88 fL 80 - 100 23 0925    Blood Type (ABO)  B   07/15/24 0840       B   23 0925    Rh Type        Antibody Screen        Chlamydia and Gohorrhea Screening  (See Report)    23 0935    Chlamydia Screen  Negative  Negative 23 0935    Gonorrhea Screen  Negative  Negative 23 0935    Syphilis Screening w/ Reflex  Nonreactive  Nonreactive 07/15/24 1020       Nonreactive  Nonreactive 23 0925    HbA1c        1 Hour Glucola  139 mg/dL <135 24 1258    Fasting Glucose Tolerance 3 hour  62 mg/dL <95 24 0950    GTT, 1 hour  122 mg/dL <180 24 1051    GTT, 2 hours  100 mg/dL <155 24 1157    GTT, 3 hours  91 mg/dL <140 24 1255          3rd Trimester       Test Value Reference Range Date Time    GBS  Abnormal   (See Report)    24 1450    CBC w/ Reflex  (See Report)    24 1258       (See Report)    23 0925    HGB        HCT        Platelet Count        MCV              Legend    ^: Historical                         Assessment/Plan   Taina Claudio is a 29 y.o.  one week postpartum from uncomplicated NSVB    Active Problems:    Gestational hypertension, antepartum ([facility])  Headache    Plan:   Admit for observation   Regular diet  Insert and maintain IV  CBC, CMP, LDH, uric acid  Tylenol 1000 mg at this time for HA  Procardia 10 mg IR  for persistent severe range BP  Procardia 30 mg ER every 24 hours  Consulted with Dr. Rubio in regards to Plan of care    Mouna Celaya, DARLENE-CECIL

## 2024-07-22 NOTE — PROGRESS NOTES
Subjective     Taina Claudio is a 29 y.o.  who presents for postpartum BP fuv s/p Vaginal, Spontaneous delivery 7/15/24. Hospital course was c/b gHTN. Pt has been monitoring her BPs at home, denies any unrelieved headaches or visual disturbances. Reports feeling very sad and emotional, crying all the time. No hx of PPD with her other 2 children. Never diagnosed with anx/dep but admits she probably has anxiety. No thoughts of hurting herself or others but has had a difficult time letting other people hold and care for the baby. Her boyfriend is supportive but at work all the time, her 2 children are 11 & 13 and eager to help.     BPs:    121/87    130/94    146/107  132/100    125/93  130/107    153/113  151/109    136/97    Postpartum Depression: High Risk (2024)    [place]  Depression Scale     Last EPDS Total Score: 11     Last EPDS Self Harm Result: Never         Assessment/Plan    gHTN  Discussed severe range BP at home and at today's visit. Discussed with Dr Spaulding who advises pt to go to L&D for BP management.  Explained that she will likely need to be on medication at least for a few weeks and that she needs to f/u with one of the physicians in 1 week.  PPD  Referral to [facility], pt to schedule  Start 50 mg Zoloft    Return to care in 1 week for BP check and again in 5 weeks for 6 wk PP exam and f/u mood, referral to [facility] and medication.

## 2024-07-22 NOTE — H&P
Labs and blood pressures reviewed with Dr. Rubio. Will continue to monitor patient closely overnight. Repeat labs not indicated per Dr. Rubio. Patient to be discharged home tomorrow on Procardia if Bps continue to remain stable.     DARLENE Pereyra-CECIL

## 2024-07-23 VITALS
WEIGHT: 170 LBS | OXYGEN SATURATION: 99 % | DIASTOLIC BLOOD PRESSURE: 96 MMHG | TEMPERATURE: 98.7 F | HEIGHT: 65 IN | BODY MASS INDEX: 28.32 KG/M2 | SYSTOLIC BLOOD PRESSURE: 129 MMHG | RESPIRATION RATE: 16 BRPM | HEART RATE: 81 BPM

## 2024-07-23 PROCEDURE — 99213 OFFICE O/P EST LOW 20 MIN: CPT

## 2024-07-23 PROCEDURE — 2500000002 HC RX 250 W HCPCS SELF ADMINISTERED DRUGS (ALT 637 FOR MEDICARE OP, ALT 636 FOR OP/ED): Performed by: OBSTETRICS & GYNECOLOGY

## 2024-07-23 PROCEDURE — G0378 HOSPITAL OBSERVATION PER HR: HCPCS

## 2024-07-23 RX ORDER — NIFEDIPINE 60 MG/1
60 TABLET, FILM COATED, EXTENDED RELEASE ORAL
Qty: 30 TABLET | Refills: 3 | Status: SHIPPED | OUTPATIENT
Start: 2024-07-24

## 2024-07-23 NOTE — DISCHARGE SUMMARY
Discharge Summary    Admission Date: 7/22/2024  Discharge Date: 7/23/34    Discharge Diagnosis  Hypertension in delivered pregnancy ([facility]HS-HCC)    Hospital Course  Delivery Date: This patient has no babies on file. This patient has no babies on file.  Delivery type: This patient has no babies on file.   GA at delivery: Unknown  Outcome: This patient has no babies on file.  Anesthesia during delivery: This patient has no babies on file.  Intrapartum complications: This patient has no babies on file.  Feeding method:       Procedures: blood pressure monitoring, blood pressure medication initiation  Contraception at discharge: none    Pertinent Physical Exam At Time of Discharge  See daily progress note    Discharge Meds     Your medication list        START taking these medications        Instructions Last Dose Given Next Dose Due   NIFEdipine ER 60 mg 24 hr tablet  Commonly known as: Adalat CC  Start taking on: July 24, 2024      Take 1 tablet (60 mg) by mouth once daily in the morning. Take before meals. Do not crush, chew, or split.              CONTINUE taking these medications        Instructions Last Dose Given Next Dose Due   albuterol 90 mcg/actuation inhaler      Inhale 2 puffs every 6 hours if needed for wheezing.       famotidine 20 mg tablet  Commonly known as: Pepcid           fluticasone 220 mcg/actuation inhaler  Commonly known as: Flovent      Inhale 2 puffs 2 times a day. Rinse mouth with water after use to reduce aftertaste and incidence of candidiasis. Do not swallow.       PRENATA ORAL           sertraline 50 mg tablet  Commonly known as: Zoloft      Take 1 tablet (50 mg) by mouth once daily.                 Where to Get Your Medications        These medications were sent to [facility]pharmacy #[id number] - [facility] [address] [facility]  [id number] [place] [id number]      Phone: [phone]   NIFEdipine ER 60 mg 24 hr tablet          Complications  Requiring Follow-Up  Elevated bps    Test Results Pending At Discharge  Pending Labs       No current pending labs.            Outpatient Follow-Up  Future Appointments   Date Time Provider Department Center   8/5/2024  2:00 PM STARLA Moore DOWSHA[facility] [place]   8/28/2024 10:00 AM STARLA Hunt [id number] [place]       Ro Singh MD

## 2024-07-23 NOTE — PROGRESS NOTES
OB Postpartum Progress Note    S:  Patient feeling well.  Headache resolved.  Feels ready to go home.  Bps mild range overnight.  Already received her procardia XL 60 mg this AM.      O:  Vitals:    24 0630   BP: (!) 127/93   Pulse: 86   Resp: 16   Temp:    SpO2: 97%       OBGyn Exam  Gen in NAD    Results for orders placed or performed during the hospital encounter of 24 (from the past 24 hour(s))   Comprehensive metabolic panel   Result Value Ref Range    Glucose 63 (L) 74 - 99 mg/dL    Sodium 139 136 - 145 mmol/L    Potassium 3.8 3.5 - 5.3 mmol/L    Chloride 106 98 - 107 mmol/L    Bicarbonate 24 21 - 32 mmol/L    Anion Gap 13 10 - 20 mmol/L    Urea Nitrogen 10 6 - 23 mg/dL    Creatinine 0.69 0.50 - 1.05 mg/dL    eGFR >90 >60 mL/min/1.73m*2    Calcium 9.6 8.6 - 10.3 mg/dL    Albumin 3.8 3.4 - 5.0 g/dL    Alkaline Phosphatase 116 (H) 33 - 110 U/L    Total Protein 7.7 6.4 - 8.2 g/dL    AST 29 9 - 39 U/L    Bilirubin, Total 0.5 0.0 - 1.2 mg/dL    ALT 31 7 - 45 U/L   Uric Acid   Result Value Ref Range    Uric Acid 8.4 (H) 2.3 - 6.7 mg/dL   Lactate Dehydrogenase   Result Value Ref Range     (H) 84 - 246 U/L   CBC   Result Value Ref Range    WBC 13.7 (H) 4.4 - 11.3 x10*3/uL    nRBC 0.0 0.0 - 0.0 /100 WBCs    RBC 5.23 (H) 4.00 - 5.20 x10*6/uL    Hemoglobin 15.3 12.0 - 16.0 g/dL    Hematocrit 45.6 36.0 - 46.0 %    MCV 87 80 - 100 fL    MCH 29.3 26.0 - 34.0 pg    MCHC 33.6 32.0 - 36.0 g/dL    RDW 12.9 11.5 - 14.5 %    Platelets 372 150 - 450 x10*3/uL         Assessment and Plan  29 y.o. y/o  PPD#8 s/p  c/b gHTN readmitted with severe range bp at home.  Headache resolved without treatment so magnesium not given.  Bps now controlled on procardia xl 60 mg daily.  Has a cuff and log at home. Will discharge home with this dose and follow up in office with physicians later this week.  Precautions discussed.    Ro Singh MD

## 2024-07-23 NOTE — CARE PLAN
The patient's goals for the shift include      The clinical goals for the shift include blood pressure WNL      Problem: Postpartum  Goal: Minimal s/sx of HDP and BP<160/110  Outcome: Progressing  Flowsheets (Taken 7/23/2024 0635)  Minimal s/sx of HDP and BP <160/110:   Med administration/monitoring of effect   Monitor QBL and vital signs     Problem: Safety - Adult  Goal: Free from fall injury  Outcome: Progressing  Flowsheets (Taken 7/23/2024 0635)  Free from fall injury: Instruct family/caregiver on patient safety     Problem: Discharge Planning  Goal: Discharge to home or other facility with appropriate resources  Outcome: Progressing  Flowsheets (Taken 7/23/2024 0635)  Discharge to home or other facility with appropriate resources: Identify barriers to discharge with patient and caregiver

## 2024-07-23 NOTE — CARE PLAN
The patient's goals for the shift include      The clinical goals for the shift include Blood Pressure remains <160/110    Over the shift, the patient made progress toward the following goals.      Problem: Postpartum  Goal: Minimal s/sx of HDP and BP<160/110  Outcome: Met     Problem: Safety - Adult  Goal: Free from fall injury  Outcome: Met     Problem: Discharge Planning  Goal: Discharge to home or other facility with appropriate resources  Outcome: Met

## 2024-07-24 ENCOUNTER — APPOINTMENT (OUTPATIENT)
Dept: OBSTETRICS AND GYNECOLOGY | Facility: CLINIC | Age: 29
End: 2024-07-24
Payer: COMMERCIAL

## 2024-07-26 ENCOUNTER — APPOINTMENT (OUTPATIENT)
Dept: OBSTETRICS AND GYNECOLOGY | Facility: CLINIC | Age: 29
End: 2024-07-26
Payer: COMMERCIAL

## 2024-07-26 ENCOUNTER — APPOINTMENT (OUTPATIENT)
Dept: RADIOLOGY | Facility: CLINIC | Age: 29
End: 2024-07-26
Payer: COMMERCIAL

## 2024-07-26 ENCOUNTER — POSTPARTUM VISIT (OUTPATIENT)
Dept: OBSTETRICS AND GYNECOLOGY | Facility: CLINIC | Age: 29
End: 2024-07-26
Payer: COMMERCIAL

## 2024-07-26 VITALS — BODY MASS INDEX: 27.92 KG/M2 | DIASTOLIC BLOOD PRESSURE: 77 MMHG | SYSTOLIC BLOOD PRESSURE: 118 MMHG | WEIGHT: 167.8 LBS

## 2024-07-26 PROCEDURE — 99213 OFFICE O/P EST LOW 20 MIN: CPT | Performed by: OBSTETRICS & GYNECOLOGY

## 2024-07-26 ASSESSMENT — EDINBURGH POSTNATAL DEPRESSION SCALE (EPDS)
TOTAL SCORE: 7
I HAVE BEEN ANXIOUS OR WORRIED FOR NO GOOD REASON: YES, SOMETIMES
I HAVE BEEN SO UNHAPPY THAT I HAVE HAD DIFFICULTY SLEEPING: NOT VERY OFTEN
THINGS HAVE BEEN GETTING ON TOP OF ME: NO, MOST OF THE TIME I HAVE COPED QUITE WELL
I HAVE BEEN ABLE TO LAUGH AND SEE THE FUNNY SIDE OF THINGS: AS MUCH AS I ALWAYS COULD
I HAVE LOOKED FORWARD WITH ENJOYMENT TO THINGS: AS MUCH AS I EVER DID
I HAVE BEEN SO UNHAPPY THAT I HAVE BEEN CRYING: NO, NEVER
I HAVE FELT SAD OR MISERABLE: NOT VERY OFTEN
I HAVE BLAMED MYSELF UNNECESSARILY WHEN THINGS WENT WRONG: NOT VERY OFTEN
THE THOUGHT OF HARMING MYSELF HAS OCCURRED TO ME: NEVER
I HAVE FELT SCARED OR PANICKY FOR NO GOOD REASON: NO, NOT MUCH

## 2024-07-26 NOTE — PROGRESS NOTES
Subjective   Patient ID: Taina Claudio is a 29 y.o. female who presents for Postpartum Follow-up.  HPI  Pt presents for BP check.  Procardia 60mg XL.  Has a cracked nipple on left side.    Review of Systems  all other symptoms were found to be neg except for HPI/CC.      Objective   Physical Exam  General  General Appearance - normal build and Well groomed, Not in acute distress, No acute respiratory distress.  Mental Status - Alert.    Integumentary  - - warm and dry with no rashes.    Head and Neck  - - normalocephalic.    Eye  - - Bilateral - pupils equal and round and sclera clear.    Chest and Lung Exam  - - Bilateral - normal breathing effort.    Musculoskeletal  - - normal posture and normal gait and station.      Assessment/Plan   Diagnoses and all orders for this visit:  Gestational hypertension without significant proteinuria, postpartum ([facility])     BP's at home good  Hypo/hypertension precautions given  Pt to call BP's in in 1wks  Continue 60mg XL procardia  F/U for 6wk PP  Gave script for Lui's Nipple Ointment    Jalyn Rubio DO 07/26/24 10:35 AM

## 2024-08-05 ENCOUNTER — APPOINTMENT (OUTPATIENT)
Dept: OBSTETRICS AND GYNECOLOGY | Facility: CLINIC | Age: 29
End: 2024-08-05
Payer: COMMERCIAL

## 2024-08-27 NOTE — PROGRESS NOTES
Subjective   29 y.o.  presenting for 6 week postpartum follow-up   Delivery Date: 7/15/2024 with Vanessa  Perineum: Intact  Infant weight: 6 lb 8 oz   Type of Delivery:   Peds: Dr Flor. Infant is gaining weight and doing well.   Had gHTN in labor/postpartum with postpartum readmit 7 days after delivery. Was discharged home on Procardia 60 mg every day, which pt continues to take. Denies HA, visual changes, epigastric pain. BP today: 122/82.   Problem List       No episode was linked to this visit.          Pain: controlled  Feeding Method: She is breast feeding with some supplementation. no breast or nursing problems  Lactation Consult Needed?: No  Birth Trauma: No  Bonding with Baby: well with baby boy named Vinny  Sexual Intimacy: No  Contraceptive Method: None  Depression - Denies depression .   Postpartum Depression: Medium Risk (2024)    [place]  Depression Scale     Last EPDS Total Score: 7     Last EPDS Self Harm Result: Never     Bowel Symptoms - Negative for abdominal discomfort, blood in stool or black stool, and negative change in bowel habits.  Bladder Symptoms - No dysuria, denies hematuria, urinary frequency, urinary urgency or incontinence.   Menses Since Delivery -none    Physical Exam  Vitals and nursing note reviewed.   Constitutional:       Appearance: Normal appearance. She is well-developed.   HENT:      Head: Normocephalic.   Pulmonary:      Effort: Pulmonary effort is normal.   Chest:   Breasts:     Right: Normal.      Left: Normal.   Abdominal:      Palpations: Abdomen is soft.      Tenderness: There is no abdominal tenderness. There is no right CVA tenderness or left CVA tenderness.   Genitourinary:     General: Normal vulva.      Uterus: Normal.       Adnexa: Right adnexa normal and left adnexa normal.   Musculoskeletal:         General: Normal range of motion.      Cervical back: Normal range of motion.   Skin:     General: Skin is warm and dry.    Neurological:      General: No focal deficit present.      Mental Status: She is alert and oriented to person, place, and time.   Psychiatric:         Attention and Perception: Attention normal.         Mood and Affect: Mood normal.         Speech: Speech normal.         Behavior: Behavior normal.         Thought Content: Thought content normal.         Judgment: Judgment normal.        Plan      A/P. 29 y.o. now , s/p , normal recovery course  Last pap: 2024 ASCUS HPV-  Postpartum contraception discussed - patient declines hormonal method at this time. Advised condom use with each intercourse, if pregnancy is not desired.   Returning to exercise and sex discussed. Patient is cleared. Encouraged patient to continue to monitor for signs or symptoms of  mood and anxiety disorders  Encouraged continued breastfeeding. Patient aware resources are available should she need them.   Discussed follow up with PCP for ongoing management of HTN/Procardia. Was established with Dr Acosta, though interested in seeing a different provider. Recommendations given; stressed importance of scheduling visit soon.     Warning s/s discussed  All questions answered    F/U for routine annual exam     LAURA Masters CNM

## 2024-08-28 ENCOUNTER — APPOINTMENT (OUTPATIENT)
Dept: OBSTETRICS AND GYNECOLOGY | Facility: CLINIC | Age: 29
End: 2024-08-28
Payer: COMMERCIAL

## 2024-08-28 VITALS
WEIGHT: 179 LBS | BODY MASS INDEX: 29.82 KG/M2 | DIASTOLIC BLOOD PRESSURE: 82 MMHG | HEIGHT: 65 IN | SYSTOLIC BLOOD PRESSURE: 122 MMHG

## 2024-08-28 ASSESSMENT — EDINBURGH POSTNATAL DEPRESSION SCALE (EPDS)
I HAVE BEEN ANXIOUS OR WORRIED FOR NO GOOD REASON: HARDLY EVER
I HAVE FELT SCARED OR PANICKY FOR NO GOOD REASON: NO, NOT AT ALL
I HAVE BEEN SO UNHAPPY THAT I HAVE BEEN CRYING: NO, NEVER
THE THOUGHT OF HARMING MYSELF HAS OCCURRED TO ME: NEVER
I HAVE LOOKED FORWARD WITH ENJOYMENT TO THINGS: AS MUCH AS I EVER DID
THINGS HAVE BEEN GETTING ON TOP OF ME: NO, I HAVE BEEN COPING AS WELL AS EVER
I HAVE BEEN SO UNHAPPY THAT I HAVE HAD DIFFICULTY SLEEPING: NOT AT ALL
I HAVE BEEN ABLE TO LAUGH AND SEE THE FUNNY SIDE OF THINGS: AS MUCH AS I ALWAYS COULD
I HAVE FELT SAD OR MISERABLE: NO, NOT AT ALL
TOTAL SCORE: 1
I HAVE BLAMED MYSELF UNNECESSARILY WHEN THINGS WENT WRONG: NO, NEVER

## 2025-01-15 ENCOUNTER — APPOINTMENT (OUTPATIENT)
Dept: PRIMARY CARE | Facility: CLINIC | Age: 30
End: 2025-01-15
Payer: COMMERCIAL

## 2025-07-17 ENCOUNTER — OFFICE VISIT (OUTPATIENT)
Facility: CLINIC | Age: 30
End: 2025-07-17
Payer: COMMERCIAL

## 2025-07-17 VITALS
HEIGHT: 65 IN | WEIGHT: 157.38 LBS | SYSTOLIC BLOOD PRESSURE: 108 MMHG | BODY MASS INDEX: 26.22 KG/M2 | DIASTOLIC BLOOD PRESSURE: 75 MMHG

## 2025-07-17 DIAGNOSIS — N76.0 ACUTE VAGINITIS: Primary | ICD-10-CM

## 2025-07-17 PROBLEM — R14.0 BLOATING: Status: RESOLVED | Noted: 2024-04-26 | Resolved: 2025-07-17

## 2025-07-17 PROBLEM — O16.9: Status: RESOLVED | Noted: 2024-07-22 | Resolved: 2025-07-17

## 2025-07-17 PROBLEM — K21.9 GASTROESOPHAGEAL REFLUX DISEASE: Status: RESOLVED | Noted: 2024-07-15 | Resolved: 2025-07-17

## 2025-07-17 PROBLEM — R11.0 NAUSEA: Status: RESOLVED | Noted: 2024-04-26 | Resolved: 2025-07-17

## 2025-07-17 PROBLEM — O13.9 GESTATIONAL HYPERTENSION, ANTEPARTUM (HHS-HCC): Status: RESOLVED | Noted: 2024-07-22 | Resolved: 2025-07-17

## 2025-07-17 PROBLEM — G56.03 BILATERAL CARPAL TUNNEL SYNDROME: Status: RESOLVED | Noted: 2024-07-15 | Resolved: 2025-07-17

## 2025-07-17 PROCEDURE — 99213 OFFICE O/P EST LOW 20 MIN: CPT | Performed by: OBSTETRICS & GYNECOLOGY

## 2025-07-17 PROCEDURE — 1036F TOBACCO NON-USER: CPT | Performed by: OBSTETRICS & GYNECOLOGY

## 2025-07-17 PROCEDURE — 3008F BODY MASS INDEX DOCD: CPT | Performed by: OBSTETRICS & GYNECOLOGY

## 2025-07-17 RX ORDER — FLUCONAZOLE 150 MG/1
150 TABLET ORAL ONCE
Qty: 2 TABLET | Refills: 1 | Status: SHIPPED | OUTPATIENT
Start: 2025-07-17 | End: 2025-07-17

## 2025-07-17 NOTE — PROGRESS NOTES
Subjective   Patient ID: Taina Claudio is a 30 y.o. female who presents for Vaginal Discharge.  HPI  Used Diflucan in the past feels this is same sx as yeast   Review of Systems  Neg   Objective   Physical Exam  Physical Exam  Constitutional:       Appearance: Normal appearance.     Abdominal:    Abdomen is flat. Soft with no masses, non tender       Genitourinary:     Labia:  No lesions  and No rash.       Urethra: No urethral lesion.      Bladder: no prolapse     Vagina: Normal mucosa, pink and no discharge.     Cervix: Normal. Small w no lesions      Uterus:    Small. Non tender. No masses.      Adnexa:  Bilaterally with no mass or tenderness.                  Assessment/Plan   Diagnoses and all orders for this visit:  Acute vaginitis  -     fluconazole (Diflucan) 150 mg tablet; Take 1 tablet (150 mg) by mouth 1 time for 1 dose. Repeat in 3 days if symptoms persist.     MD Gwendolyn Dill, [facility] 07/17/25 3:18 PM